# Patient Record
Sex: MALE | Race: WHITE | URBAN - METROPOLITAN AREA
[De-identification: names, ages, dates, MRNs, and addresses within clinical notes are randomized per-mention and may not be internally consistent; named-entity substitution may affect disease eponyms.]

---

## 2017-03-06 ENCOUNTER — OFFICE VISIT (OUTPATIENT)
Dept: INTERNAL MEDICINE CLINIC | Age: 52
End: 2017-03-06

## 2017-03-06 VITALS
BODY MASS INDEX: 33.32 KG/M2 | SYSTOLIC BLOOD PRESSURE: 136 MMHG | WEIGHT: 246 LBS | HEART RATE: 76 BPM | DIASTOLIC BLOOD PRESSURE: 82 MMHG | HEIGHT: 72 IN

## 2017-03-06 DIAGNOSIS — I10 ESSENTIAL HYPERTENSION, BENIGN: Primary | ICD-10-CM

## 2017-03-06 DIAGNOSIS — I10 ESSENTIAL HYPERTENSION, BENIGN: ICD-10-CM

## 2017-03-06 LAB
ANION GAP SERPL CALCULATED.3IONS-SCNC: 14 MMOL/L (ref 3–16)
BUN BLDV-MCNC: 12 MG/DL (ref 7–20)
CALCIUM SERPL-MCNC: 9.3 MG/DL (ref 8.3–10.6)
CHLORIDE BLD-SCNC: 103 MMOL/L (ref 99–110)
CO2: 25 MMOL/L (ref 21–32)
CREAT SERPL-MCNC: 0.9 MG/DL (ref 0.9–1.3)
GFR AFRICAN AMERICAN: >60
GFR NON-AFRICAN AMERICAN: >60
GLUCOSE BLD-MCNC: 105 MG/DL (ref 70–99)
POTASSIUM SERPL-SCNC: 4.6 MMOL/L (ref 3.5–5.1)
SODIUM BLD-SCNC: 142 MMOL/L (ref 136–145)

## 2017-03-06 PROCEDURE — G8484 FLU IMMUNIZE NO ADMIN: HCPCS | Performed by: INTERNAL MEDICINE

## 2017-03-06 PROCEDURE — 99213 OFFICE O/P EST LOW 20 MIN: CPT | Performed by: INTERNAL MEDICINE

## 2017-03-06 PROCEDURE — 1036F TOBACCO NON-USER: CPT | Performed by: INTERNAL MEDICINE

## 2017-03-06 PROCEDURE — G8427 DOCREV CUR MEDS BY ELIG CLIN: HCPCS | Performed by: INTERNAL MEDICINE

## 2017-03-06 PROCEDURE — G8417 CALC BMI ABV UP PARAM F/U: HCPCS | Performed by: INTERNAL MEDICINE

## 2017-03-06 RX ORDER — LISINOPRIL 20 MG/1
TABLET ORAL
Qty: 90 TABLET | Refills: 1 | Status: SHIPPED | OUTPATIENT
Start: 2017-03-06 | End: 2017-09-06 | Stop reason: SDUPTHER

## 2017-03-06 ASSESSMENT — ENCOUNTER SYMPTOMS
SHORTNESS OF BREATH: 0
ABDOMINAL PAIN: 0
NAUSEA: 0
CHEST TIGHTNESS: 0
DIARRHEA: 0
BLOOD IN STOOL: 0
WHEEZING: 0
COUGH: 0
VOMITING: 0

## 2017-05-17 ENCOUNTER — TELEPHONE (OUTPATIENT)
Dept: INTERNAL MEDICINE CLINIC | Age: 52
End: 2017-05-17

## 2017-08-25 ENCOUNTER — OFFICE VISIT (OUTPATIENT)
Dept: INTERNAL MEDICINE CLINIC | Age: 52
End: 2017-08-25

## 2017-08-25 VITALS
BODY MASS INDEX: 33.86 KG/M2 | SYSTOLIC BLOOD PRESSURE: 132 MMHG | WEIGHT: 250 LBS | HEART RATE: 72 BPM | HEIGHT: 72 IN | DIASTOLIC BLOOD PRESSURE: 80 MMHG

## 2017-08-25 DIAGNOSIS — I10 ESSENTIAL HYPERTENSION, BENIGN: ICD-10-CM

## 2017-08-25 DIAGNOSIS — I10 ESSENTIAL HYPERTENSION, BENIGN: Primary | ICD-10-CM

## 2017-08-25 LAB
A/G RATIO: 1.5 (ref 1.1–2.2)
ALBUMIN SERPL-MCNC: 4.7 G/DL (ref 3.4–5)
ALP BLD-CCNC: 56 U/L (ref 40–129)
ALT SERPL-CCNC: 29 U/L (ref 10–40)
ANION GAP SERPL CALCULATED.3IONS-SCNC: 16 MMOL/L (ref 3–16)
AST SERPL-CCNC: 23 U/L (ref 15–37)
BASOPHILS ABSOLUTE: 0 K/UL (ref 0–0.2)
BASOPHILS RELATIVE PERCENT: 0.7 %
BILIRUB SERPL-MCNC: 0.7 MG/DL (ref 0–1)
BUN BLDV-MCNC: 13 MG/DL (ref 7–20)
CALCIUM SERPL-MCNC: 10.1 MG/DL (ref 8.3–10.6)
CHLORIDE BLD-SCNC: 98 MMOL/L (ref 99–110)
CHOLESTEROL, TOTAL: 214 MG/DL (ref 0–199)
CO2: 25 MMOL/L (ref 21–32)
CREAT SERPL-MCNC: 1 MG/DL (ref 0.9–1.3)
EOSINOPHILS ABSOLUTE: 0.1 K/UL (ref 0–0.6)
EOSINOPHILS RELATIVE PERCENT: 1.5 %
GFR AFRICAN AMERICAN: >60
GFR NON-AFRICAN AMERICAN: >60
GLOBULIN: 3.2 G/DL
GLUCOSE BLD-MCNC: 86 MG/DL (ref 70–99)
HCT VFR BLD CALC: 45.8 % (ref 40.5–52.5)
HDLC SERPL-MCNC: 51 MG/DL (ref 40–60)
HEMOGLOBIN: 15.6 G/DL (ref 13.5–17.5)
LDL CHOLESTEROL CALCULATED: 125 MG/DL
LYMPHOCYTES ABSOLUTE: 2.2 K/UL (ref 1–5.1)
LYMPHOCYTES RELATIVE PERCENT: 32.3 %
MCH RBC QN AUTO: 29.9 PG (ref 26–34)
MCHC RBC AUTO-ENTMCNC: 34 G/DL (ref 31–36)
MCV RBC AUTO: 88.1 FL (ref 80–100)
MONOCYTES ABSOLUTE: 0.5 K/UL (ref 0–1.3)
MONOCYTES RELATIVE PERCENT: 7.4 %
NEUTROPHILS ABSOLUTE: 4 K/UL (ref 1.7–7.7)
NEUTROPHILS RELATIVE PERCENT: 58.1 %
PDW BLD-RTO: 13.2 % (ref 12.4–15.4)
PLATELET # BLD: 261 K/UL (ref 135–450)
PMV BLD AUTO: 9.1 FL (ref 5–10.5)
POTASSIUM SERPL-SCNC: 4.2 MMOL/L (ref 3.5–5.1)
RBC # BLD: 5.2 M/UL (ref 4.2–5.9)
SODIUM BLD-SCNC: 139 MMOL/L (ref 136–145)
TOTAL PROTEIN: 7.9 G/DL (ref 6.4–8.2)
TRIGL SERPL-MCNC: 188 MG/DL (ref 0–150)
VLDLC SERPL CALC-MCNC: 38 MG/DL
WBC # BLD: 6.8 K/UL (ref 4–11)

## 2017-08-25 PROCEDURE — 81002 URINALYSIS NONAUTO W/O SCOPE: CPT | Performed by: INTERNAL MEDICINE

## 2017-08-25 PROCEDURE — 99213 OFFICE O/P EST LOW 20 MIN: CPT | Performed by: INTERNAL MEDICINE

## 2017-08-25 PROCEDURE — 1036F TOBACCO NON-USER: CPT | Performed by: INTERNAL MEDICINE

## 2017-08-25 PROCEDURE — G8427 DOCREV CUR MEDS BY ELIG CLIN: HCPCS | Performed by: INTERNAL MEDICINE

## 2017-08-25 PROCEDURE — G8417 CALC BMI ABV UP PARAM F/U: HCPCS | Performed by: INTERNAL MEDICINE

## 2017-08-25 ASSESSMENT — ENCOUNTER SYMPTOMS
NAUSEA: 0
WHEEZING: 0
BLOOD IN STOOL: 0
COUGH: 0
ABDOMINAL PAIN: 0
VOMITING: 0
DIARRHEA: 0
CHEST TIGHTNESS: 0
SHORTNESS OF BREATH: 0

## 2017-09-06 RX ORDER — LISINOPRIL 20 MG/1
TABLET ORAL
Qty: 90 TABLET | Refills: 1 | Status: SHIPPED | OUTPATIENT
Start: 2017-09-06 | End: 2018-03-14 | Stop reason: SDUPTHER

## 2018-03-14 ENCOUNTER — OFFICE VISIT (OUTPATIENT)
Dept: INTERNAL MEDICINE CLINIC | Age: 53
End: 2018-03-14

## 2018-03-14 VITALS
HEIGHT: 72 IN | BODY MASS INDEX: 33.86 KG/M2 | HEART RATE: 76 BPM | WEIGHT: 250 LBS | DIASTOLIC BLOOD PRESSURE: 82 MMHG | SYSTOLIC BLOOD PRESSURE: 110 MMHG

## 2018-03-14 DIAGNOSIS — I10 ESSENTIAL HYPERTENSION, BENIGN: ICD-10-CM

## 2018-03-14 DIAGNOSIS — Z00.00 ROUTINE GENERAL MEDICAL EXAMINATION AT A HEALTH CARE FACILITY: ICD-10-CM

## 2018-03-14 DIAGNOSIS — I10 ESSENTIAL HYPERTENSION, BENIGN: Primary | ICD-10-CM

## 2018-03-14 DIAGNOSIS — M72.2 PLANTAR FASCIITIS: ICD-10-CM

## 2018-03-14 DIAGNOSIS — Z12.11 SCREENING FOR COLON CANCER: ICD-10-CM

## 2018-03-14 LAB
BASOPHILS ABSOLUTE: 0.2 K/UL (ref 0–0.2)
BASOPHILS RELATIVE PERCENT: 1.8 %
EOSINOPHILS ABSOLUTE: 0.1 K/UL (ref 0–0.6)
EOSINOPHILS RELATIVE PERCENT: 1.2 %
HCT VFR BLD CALC: 45.8 % (ref 40.5–52.5)
HEMOGLOBIN: 15.6 G/DL (ref 13.5–17.5)
LYMPHOCYTES ABSOLUTE: 2.6 K/UL (ref 1–5.1)
LYMPHOCYTES RELATIVE PERCENT: 30.4 %
MCH RBC QN AUTO: 29.6 PG (ref 26–34)
MCHC RBC AUTO-ENTMCNC: 34.1 G/DL (ref 31–36)
MCV RBC AUTO: 86.8 FL (ref 80–100)
MONOCYTES ABSOLUTE: 0.6 K/UL (ref 0–1.3)
MONOCYTES RELATIVE PERCENT: 7.1 %
NEUTROPHILS ABSOLUTE: 5.1 K/UL (ref 1.7–7.7)
NEUTROPHILS RELATIVE PERCENT: 59.5 %
PDW BLD-RTO: 13 % (ref 12.4–15.4)
PLATELET # BLD: 273 K/UL (ref 135–450)
PMV BLD AUTO: 9.1 FL (ref 5–10.5)
RBC # BLD: 5.28 M/UL (ref 4.2–5.9)
WBC # BLD: 8.6 K/UL (ref 4–11)

## 2018-03-14 PROCEDURE — 99213 OFFICE O/P EST LOW 20 MIN: CPT | Performed by: INTERNAL MEDICINE

## 2018-03-14 PROCEDURE — G8417 CALC BMI ABV UP PARAM F/U: HCPCS | Performed by: INTERNAL MEDICINE

## 2018-03-14 PROCEDURE — 3017F COLORECTAL CA SCREEN DOC REV: CPT | Performed by: INTERNAL MEDICINE

## 2018-03-14 PROCEDURE — G8484 FLU IMMUNIZE NO ADMIN: HCPCS | Performed by: INTERNAL MEDICINE

## 2018-03-14 PROCEDURE — G8427 DOCREV CUR MEDS BY ELIG CLIN: HCPCS | Performed by: INTERNAL MEDICINE

## 2018-03-14 PROCEDURE — 1036F TOBACCO NON-USER: CPT | Performed by: INTERNAL MEDICINE

## 2018-03-14 RX ORDER — LISINOPRIL 10 MG/1
TABLET ORAL
Qty: 30 TABLET | Refills: 2 | Status: SHIPPED | OUTPATIENT
Start: 2018-03-14 | End: 2018-06-15 | Stop reason: SDUPTHER

## 2018-03-14 ASSESSMENT — ENCOUNTER SYMPTOMS
BLOOD IN STOOL: 0
ABDOMINAL PAIN: 0
SHORTNESS OF BREATH: 0
WHEEZING: 0
CHEST TIGHTNESS: 0
NAUSEA: 0
COUGH: 0
VOMITING: 0
DIARRHEA: 0

## 2018-03-14 NOTE — PROGRESS NOTES
Subjective:      Patient ID: Nessa Morton is a 50 y.o. male. HPI  Is here for follow up of HTN. Patient's BP is controlled on current medications. He is doing well. c/o left foot pain. Review of Systems   Constitutional: Negative. Negative for fatigue and fever. Respiratory: Negative for cough, chest tightness, shortness of breath and wheezing. Cardiovascular: Negative for chest pain and palpitations. Gastrointestinal: Negative for abdominal pain, blood in stool, diarrhea, nausea and vomiting. Objective:   Physical Exam   Constitutional: He is oriented to person, place, and time. He appears well-developed and well-nourished. HENT:   Head: Normocephalic and atraumatic. Eyes: Pupils are equal, round, and reactive to light. Neck: Normal range of motion. Neck supple. No thyromegaly present. Cardiovascular: Normal rate, regular rhythm and normal heart sounds. Exam reveals no gallop and no friction rub. No murmur heard. No carotid bruit   Pulmonary/Chest: Effort normal and breath sounds normal. No respiratory distress. He has no wheezes. He has no rales. He exhibits no tenderness. Abdominal: Soft. Bowel sounds are normal. He exhibits no distension and no mass. There is no tenderness. There is no rebound and no guarding. Musculoskeletal: He exhibits no edema. Neurological: He is alert and oriented to person, place, and time. Assessment:      1. Essential hypertension, benign  CBC Auto Differential    Comprehensive Metabolic Panel   2. Plantar fasciitis     3. Routine general medical examination at a health care facility  Hemoglobin A1C    Comprehensive Metabolic Panel    PSA, Prostatic Specific Antigen           Plan:      Blood pressure is low. Decrease lisinopril to 10 mg daily    Schedule colonoscopy. Exercises. Soft insoles.     Routine labs.

## 2018-03-15 LAB
A/G RATIO: 1.6 (ref 1.1–2.2)
ALBUMIN SERPL-MCNC: 4.5 G/DL (ref 3.4–5)
ALP BLD-CCNC: 58 U/L (ref 40–129)
ALT SERPL-CCNC: 30 U/L (ref 10–40)
ANION GAP SERPL CALCULATED.3IONS-SCNC: 18 MMOL/L (ref 3–16)
AST SERPL-CCNC: 26 U/L (ref 15–37)
BILIRUB SERPL-MCNC: 0.8 MG/DL (ref 0–1)
BUN BLDV-MCNC: 13 MG/DL (ref 7–20)
CALCIUM SERPL-MCNC: 9.2 MG/DL (ref 8.3–10.6)
CHLORIDE BLD-SCNC: 99 MMOL/L (ref 99–110)
CO2: 23 MMOL/L (ref 21–32)
CREAT SERPL-MCNC: 0.9 MG/DL (ref 0.9–1.3)
ESTIMATED AVERAGE GLUCOSE: 116.9 MG/DL
GFR AFRICAN AMERICAN: >60
GFR NON-AFRICAN AMERICAN: >60
GLOBULIN: 2.8 G/DL
GLUCOSE BLD-MCNC: 81 MG/DL (ref 70–99)
HBA1C MFR BLD: 5.7 %
POTASSIUM SERPL-SCNC: 4.2 MMOL/L (ref 3.5–5.1)
PROSTATE SPECIFIC ANTIGEN: 0.89 NG/ML (ref 0–4)
SODIUM BLD-SCNC: 140 MMOL/L (ref 136–145)
TOTAL PROTEIN: 7.3 G/DL (ref 6.4–8.2)

## 2018-06-15 ENCOUNTER — TELEPHONE (OUTPATIENT)
Dept: INTERNAL MEDICINE CLINIC | Age: 53
End: 2018-06-15

## 2018-06-15 RX ORDER — LISINOPRIL 10 MG/1
TABLET ORAL
Qty: 13 TABLET | Refills: 0 | Status: SHIPPED | OUTPATIENT
Start: 2018-06-15 | End: 2018-06-27 | Stop reason: SDUPTHER

## 2018-06-27 ENCOUNTER — OFFICE VISIT (OUTPATIENT)
Dept: INTERNAL MEDICINE CLINIC | Age: 53
End: 2018-06-27

## 2018-06-27 VITALS
DIASTOLIC BLOOD PRESSURE: 80 MMHG | WEIGHT: 238 LBS | BODY MASS INDEX: 32.23 KG/M2 | SYSTOLIC BLOOD PRESSURE: 122 MMHG | HEIGHT: 72 IN | HEART RATE: 80 BPM

## 2018-06-27 DIAGNOSIS — I10 ESSENTIAL HYPERTENSION, BENIGN: Primary | ICD-10-CM

## 2018-06-27 PROCEDURE — G8427 DOCREV CUR MEDS BY ELIG CLIN: HCPCS | Performed by: INTERNAL MEDICINE

## 2018-06-27 PROCEDURE — G8417 CALC BMI ABV UP PARAM F/U: HCPCS | Performed by: INTERNAL MEDICINE

## 2018-06-27 PROCEDURE — 99213 OFFICE O/P EST LOW 20 MIN: CPT | Performed by: INTERNAL MEDICINE

## 2018-06-27 PROCEDURE — 3017F COLORECTAL CA SCREEN DOC REV: CPT | Performed by: INTERNAL MEDICINE

## 2018-06-27 PROCEDURE — 1036F TOBACCO NON-USER: CPT | Performed by: INTERNAL MEDICINE

## 2018-06-27 RX ORDER — LISINOPRIL 10 MG/1
TABLET ORAL
Qty: 90 TABLET | Refills: 1 | Status: SHIPPED | OUTPATIENT
Start: 2018-06-27 | End: 2018-12-29 | Stop reason: SDUPTHER

## 2018-06-27 ASSESSMENT — ENCOUNTER SYMPTOMS
BLOOD IN STOOL: 0
COUGH: 0
NAUSEA: 0
ABDOMINAL PAIN: 0
VOMITING: 0
CHEST TIGHTNESS: 0
DIARRHEA: 0
SHORTNESS OF BREATH: 0
WHEEZING: 0

## 2018-12-31 RX ORDER — LISINOPRIL 10 MG/1
TABLET ORAL
Qty: 90 TABLET | Refills: 1 | Status: SHIPPED | OUTPATIENT
Start: 2018-12-31 | End: 2019-09-11 | Stop reason: SDUPTHER

## 2019-03-04 ENCOUNTER — OFFICE VISIT (OUTPATIENT)
Dept: INTERNAL MEDICINE CLINIC | Age: 54
End: 2019-03-04

## 2019-03-04 VITALS
SYSTOLIC BLOOD PRESSURE: 130 MMHG | HEIGHT: 72 IN | BODY MASS INDEX: 33.05 KG/M2 | DIASTOLIC BLOOD PRESSURE: 80 MMHG | WEIGHT: 244 LBS | HEART RATE: 76 BPM

## 2019-03-04 DIAGNOSIS — I10 ESSENTIAL HYPERTENSION: ICD-10-CM

## 2019-03-04 DIAGNOSIS — I10 ESSENTIAL HYPERTENSION: Primary | ICD-10-CM

## 2019-03-04 DIAGNOSIS — Z00.00 ROUTINE GENERAL MEDICAL EXAMINATION AT A HEALTH CARE FACILITY: ICD-10-CM

## 2019-03-04 LAB
A/G RATIO: 1.8 (ref 1.1–2.2)
ALBUMIN SERPL-MCNC: 4.9 G/DL (ref 3.4–5)
ALP BLD-CCNC: 64 U/L (ref 40–129)
ALT SERPL-CCNC: 27 U/L (ref 10–40)
ANION GAP SERPL CALCULATED.3IONS-SCNC: 16 MMOL/L (ref 3–16)
AST SERPL-CCNC: 22 U/L (ref 15–37)
BASOPHILS ABSOLUTE: 0.1 K/UL (ref 0–0.2)
BASOPHILS RELATIVE PERCENT: 0.7 %
BILIRUB SERPL-MCNC: 0.7 MG/DL (ref 0–1)
BUN BLDV-MCNC: 12 MG/DL (ref 7–20)
CALCIUM SERPL-MCNC: 9.8 MG/DL (ref 8.3–10.6)
CHLORIDE BLD-SCNC: 99 MMOL/L (ref 99–110)
CHOLESTEROL, TOTAL: 175 MG/DL (ref 0–199)
CO2: 24 MMOL/L (ref 21–32)
CREAT SERPL-MCNC: 0.9 MG/DL (ref 0.9–1.3)
EOSINOPHILS ABSOLUTE: 0.1 K/UL (ref 0–0.6)
EOSINOPHILS RELATIVE PERCENT: 1 %
GFR AFRICAN AMERICAN: >60
GFR NON-AFRICAN AMERICAN: >60
GLOBULIN: 2.8 G/DL
GLUCOSE BLD-MCNC: 86 MG/DL (ref 70–99)
HCT VFR BLD CALC: 46.7 % (ref 40.5–52.5)
HDLC SERPL-MCNC: 46 MG/DL (ref 40–60)
HEMOGLOBIN: 15.7 G/DL (ref 13.5–17.5)
LDL CHOLESTEROL CALCULATED: 87 MG/DL
LYMPHOCYTES ABSOLUTE: 2.7 K/UL (ref 1–5.1)
LYMPHOCYTES RELATIVE PERCENT: 34.1 %
MCH RBC QN AUTO: 29.7 PG (ref 26–34)
MCHC RBC AUTO-ENTMCNC: 33.7 G/DL (ref 31–36)
MCV RBC AUTO: 88.4 FL (ref 80–100)
MONOCYTES ABSOLUTE: 0.6 K/UL (ref 0–1.3)
MONOCYTES RELATIVE PERCENT: 7.6 %
NEUTROPHILS ABSOLUTE: 4.5 K/UL (ref 1.7–7.7)
NEUTROPHILS RELATIVE PERCENT: 56.6 %
PDW BLD-RTO: 13.1 % (ref 12.4–15.4)
PLATELET # BLD: 298 K/UL (ref 135–450)
PMV BLD AUTO: 8.8 FL (ref 5–10.5)
POTASSIUM SERPL-SCNC: 4.4 MMOL/L (ref 3.5–5.1)
PROSTATE SPECIFIC ANTIGEN: 1.16 NG/ML (ref 0–4)
RBC # BLD: 5.29 M/UL (ref 4.2–5.9)
SODIUM BLD-SCNC: 139 MMOL/L (ref 136–145)
TOTAL PROTEIN: 7.7 G/DL (ref 6.4–8.2)
TRIGL SERPL-MCNC: 209 MG/DL (ref 0–150)
VLDLC SERPL CALC-MCNC: 42 MG/DL
WBC # BLD: 8 K/UL (ref 4–11)

## 2019-03-04 PROCEDURE — G8484 FLU IMMUNIZE NO ADMIN: HCPCS | Performed by: INTERNAL MEDICINE

## 2019-03-04 PROCEDURE — 3017F COLORECTAL CA SCREEN DOC REV: CPT | Performed by: INTERNAL MEDICINE

## 2019-03-04 PROCEDURE — 99213 OFFICE O/P EST LOW 20 MIN: CPT | Performed by: INTERNAL MEDICINE

## 2019-03-04 PROCEDURE — G8427 DOCREV CUR MEDS BY ELIG CLIN: HCPCS | Performed by: INTERNAL MEDICINE

## 2019-03-04 PROCEDURE — 1036F TOBACCO NON-USER: CPT | Performed by: INTERNAL MEDICINE

## 2019-03-04 PROCEDURE — G8417 CALC BMI ABV UP PARAM F/U: HCPCS | Performed by: INTERNAL MEDICINE

## 2019-03-04 ASSESSMENT — ENCOUNTER SYMPTOMS
BLOOD IN STOOL: 0
VOMITING: 0
COUGH: 0
ABDOMINAL PAIN: 0
NAUSEA: 0
SHORTNESS OF BREATH: 0
CHEST TIGHTNESS: 0
WHEEZING: 0
DIARRHEA: 0

## 2019-03-05 LAB
ESTIMATED AVERAGE GLUCOSE: 119.8 MG/DL
HBA1C MFR BLD: 5.8 %

## 2019-03-11 ENCOUNTER — TELEPHONE (OUTPATIENT)
Dept: INTERNAL MEDICINE CLINIC | Age: 54
End: 2019-03-11

## 2019-03-13 ENCOUNTER — NURSE ONLY (OUTPATIENT)
Dept: INTERNAL MEDICINE CLINIC | Age: 54
End: 2019-03-13

## 2019-03-13 DIAGNOSIS — Z12.11 COLON CANCER SCREENING: Primary | ICD-10-CM

## 2019-03-13 LAB
CONTROL: NORMAL
HEMOCCULT STL QL: NORMAL

## 2019-03-13 PROCEDURE — 82274 ASSAY TEST FOR BLOOD FECAL: CPT | Performed by: INTERNAL MEDICINE

## 2019-09-11 ENCOUNTER — OFFICE VISIT (OUTPATIENT)
Dept: INTERNAL MEDICINE CLINIC | Age: 54
End: 2019-09-11

## 2019-09-11 VITALS
HEART RATE: 76 BPM | HEIGHT: 72 IN | SYSTOLIC BLOOD PRESSURE: 110 MMHG | WEIGHT: 239 LBS | BODY MASS INDEX: 32.37 KG/M2 | DIASTOLIC BLOOD PRESSURE: 80 MMHG

## 2019-09-11 DIAGNOSIS — M72.2 PLANTAR FASCIITIS: ICD-10-CM

## 2019-09-11 DIAGNOSIS — I10 ESSENTIAL HYPERTENSION, BENIGN: Primary | ICD-10-CM

## 2019-09-11 PROCEDURE — 1036F TOBACCO NON-USER: CPT | Performed by: INTERNAL MEDICINE

## 2019-09-11 PROCEDURE — G8417 CALC BMI ABV UP PARAM F/U: HCPCS | Performed by: INTERNAL MEDICINE

## 2019-09-11 PROCEDURE — G8427 DOCREV CUR MEDS BY ELIG CLIN: HCPCS | Performed by: INTERNAL MEDICINE

## 2019-09-11 PROCEDURE — 99213 OFFICE O/P EST LOW 20 MIN: CPT | Performed by: INTERNAL MEDICINE

## 2019-09-11 PROCEDURE — 3017F COLORECTAL CA SCREEN DOC REV: CPT | Performed by: INTERNAL MEDICINE

## 2019-09-11 RX ORDER — LISINOPRIL 10 MG/1
TABLET ORAL
Qty: 90 TABLET | Refills: 1 | Status: SHIPPED | OUTPATIENT
Start: 2019-09-11 | End: 2020-03-11 | Stop reason: SDUPTHER

## 2019-09-11 ASSESSMENT — ENCOUNTER SYMPTOMS
ABDOMINAL PAIN: 0
SHORTNESS OF BREATH: 0
WHEEZING: 0
NAUSEA: 0
VOMITING: 0
DIARRHEA: 0
BLOOD IN STOOL: 0
CHEST TIGHTNESS: 0
COUGH: 0

## 2019-09-11 NOTE — PATIENT INSTRUCTIONS
Patient Education        Plantar Fasciitis: Exercises  Introduction  Here are some examples of exercises for you to try. The exercises may be suggested for a condition or for rehabilitation. Start each exercise slowly. Ease off the exercises if you start to have pain. You will be told when to start these exercises and which ones will work best for you. How to do the exercises  Towel stretch    1. Sit with your legs extended and knees straight. 2. Place a towel around your foot just under the toes. 3. Hold each end of the towel in each hand, with your hands above your knees. 4. Pull back with the towel so that your foot stretches toward you. 5. Hold the position for at least 15 to 30 seconds. 6. Repeat 2 to 4 times a session, up to 5 sessions a day. Calf stretch    1. Stand facing a wall with your hands on the wall at about eye level. Put the leg you want to stretch about a step behind your other leg. 2. Keeping your back heel on the floor, bend your front knee until you feel a stretch in the back leg. 3. Hold the stretch for 15 to 30 seconds. Repeat 2 to 4 times. Plantar fascia and calf stretch    1. Stand on a step as shown above. Be sure to hold on to the banister. 2. Slowly let your heels down over the edge of the step as you relax your calf muscles. You should feel a gentle stretch across the bottom of your foot and up the back of your leg to your knee. 3. Hold the stretch about 15 to 30 seconds, and then tighten your calf muscle a little to bring your heel back up to the level of the step. Repeat 2 to 4 times. Towel curls    1. While sitting, place your foot on a towel on the floor and scrunch the towel toward you with your toes. 2. Then, also using your toes, push the towel away from you. [place] pickups    1. Put marbles on the floor next to a cup.  2. Using your toes, try to lift the marbles up from the floor and put them in the cup.     Follow-up care is a key part of your treatment

## 2020-03-11 ENCOUNTER — OFFICE VISIT (OUTPATIENT)
Dept: INTERNAL MEDICINE CLINIC | Age: 55
End: 2020-03-11

## 2020-03-11 VITALS
SYSTOLIC BLOOD PRESSURE: 138 MMHG | DIASTOLIC BLOOD PRESSURE: 90 MMHG | HEART RATE: 76 BPM | WEIGHT: 250 LBS | HEIGHT: 72 IN | BODY MASS INDEX: 33.86 KG/M2

## 2020-03-11 DIAGNOSIS — I10 ESSENTIAL HYPERTENSION, BENIGN: ICD-10-CM

## 2020-03-11 DIAGNOSIS — Z00.00 ROUTINE GENERAL MEDICAL EXAMINATION AT A HEALTH CARE FACILITY: ICD-10-CM

## 2020-03-11 PROCEDURE — 1036F TOBACCO NON-USER: CPT | Performed by: INTERNAL MEDICINE

## 2020-03-11 PROCEDURE — G8484 FLU IMMUNIZE NO ADMIN: HCPCS | Performed by: INTERNAL MEDICINE

## 2020-03-11 PROCEDURE — 3017F COLORECTAL CA SCREEN DOC REV: CPT | Performed by: INTERNAL MEDICINE

## 2020-03-11 PROCEDURE — G8427 DOCREV CUR MEDS BY ELIG CLIN: HCPCS | Performed by: INTERNAL MEDICINE

## 2020-03-11 PROCEDURE — G8417 CALC BMI ABV UP PARAM F/U: HCPCS | Performed by: INTERNAL MEDICINE

## 2020-03-11 PROCEDURE — 99213 OFFICE O/P EST LOW 20 MIN: CPT | Performed by: INTERNAL MEDICINE

## 2020-03-11 RX ORDER — LISINOPRIL 20 MG/1
TABLET ORAL
Qty: 30 TABLET | Refills: 2 | Status: SHIPPED | OUTPATIENT
Start: 2020-03-11 | End: 2020-06-09

## 2020-03-11 ASSESSMENT — ENCOUNTER SYMPTOMS
ABDOMINAL PAIN: 0
WHEEZING: 0
CHEST TIGHTNESS: 0
NAUSEA: 0
BLOOD IN STOOL: 0
VOMITING: 0
DIARRHEA: 0
SHORTNESS OF BREATH: 0
COUGH: 0

## 2020-03-12 LAB
ANION GAP SERPL CALCULATED.3IONS-SCNC: 13 MMOL/L (ref 3–16)
BUN BLDV-MCNC: 11 MG/DL (ref 7–20)
CALCIUM SERPL-MCNC: 9.6 MG/DL (ref 8.3–10.6)
CHLORIDE BLD-SCNC: 101 MMOL/L (ref 99–110)
CO2: 23 MMOL/L (ref 21–32)
CREAT SERPL-MCNC: 1 MG/DL (ref 0.9–1.3)
ESTIMATED AVERAGE GLUCOSE: 108.3 MG/DL
GFR AFRICAN AMERICAN: >60
GFR NON-AFRICAN AMERICAN: >60
GLUCOSE BLD-MCNC: 93 MG/DL (ref 70–99)
HBA1C MFR BLD: 5.4 %
POTASSIUM SERPL-SCNC: 4.6 MMOL/L (ref 3.5–5.1)
PROSTATE SPECIFIC ANTIGEN: 0.89 NG/ML (ref 0–4)
SODIUM BLD-SCNC: 137 MMOL/L (ref 136–145)

## 2020-06-09 RX ORDER — LISINOPRIL 20 MG/1
TABLET ORAL
Qty: 90 TABLET | Refills: 0 | Status: SHIPPED | OUTPATIENT
Start: 2020-06-09 | End: 2020-09-14 | Stop reason: SDUPTHER

## 2020-09-14 ENCOUNTER — OFFICE VISIT (OUTPATIENT)
Dept: INTERNAL MEDICINE CLINIC | Age: 55
End: 2020-09-14

## 2020-09-14 VITALS
DIASTOLIC BLOOD PRESSURE: 80 MMHG | HEART RATE: 76 BPM | HEIGHT: 72 IN | BODY MASS INDEX: 33.59 KG/M2 | SYSTOLIC BLOOD PRESSURE: 116 MMHG | WEIGHT: 248 LBS

## 2020-09-14 DIAGNOSIS — E78.5 HYPERLIPIDEMIA, UNSPECIFIED HYPERLIPIDEMIA TYPE: ICD-10-CM

## 2020-09-14 PROCEDURE — 1036F TOBACCO NON-USER: CPT | Performed by: INTERNAL MEDICINE

## 2020-09-14 PROCEDURE — G0008 ADMIN INFLUENZA VIRUS VAC: HCPCS | Performed by: INTERNAL MEDICINE

## 2020-09-14 PROCEDURE — G8417 CALC BMI ABV UP PARAM F/U: HCPCS | Performed by: INTERNAL MEDICINE

## 2020-09-14 PROCEDURE — 90682 RIV4 VACC RECOMBINANT DNA IM: CPT | Performed by: INTERNAL MEDICINE

## 2020-09-14 PROCEDURE — 3017F COLORECTAL CA SCREEN DOC REV: CPT | Performed by: INTERNAL MEDICINE

## 2020-09-14 PROCEDURE — G8428 CUR MEDS NOT DOCUMENT: HCPCS | Performed by: INTERNAL MEDICINE

## 2020-09-14 PROCEDURE — 99213 OFFICE O/P EST LOW 20 MIN: CPT | Performed by: INTERNAL MEDICINE

## 2020-09-14 RX ORDER — LISINOPRIL 20 MG/1
TABLET ORAL
Qty: 90 TABLET | Refills: 1 | Status: SHIPPED | OUTPATIENT
Start: 2020-09-14 | End: 2021-03-10

## 2020-09-14 ASSESSMENT — ENCOUNTER SYMPTOMS
WHEEZING: 0
CHEST TIGHTNESS: 0
SHORTNESS OF BREATH: 0
ABDOMINAL PAIN: 0
NAUSEA: 0
VOMITING: 0
COUGH: 0
BLOOD IN STOOL: 0
DIARRHEA: 0

## 2020-09-14 NOTE — PROGRESS NOTES
Subjective:      Patient ID: Alfreda Vick is a 48 y.o. male. HPI  Is here for follow up of HTN. Patient's BP is controlled on current medications. He is doing well. Review of Systems   Constitutional: Negative. Negative for fatigue and fever. Respiratory: Negative for cough, chest tightness, shortness of breath and wheezing. Cardiovascular: Negative for chest pain and palpitations. Gastrointestinal: Negative for abdominal pain, blood in stool, diarrhea, nausea and vomiting. Objective:   Physical Exam  Constitutional:       Appearance: He is well-developed. HENT:      Head: Normocephalic and atraumatic. Eyes:      Pupils: Pupils are equal, round, and reactive to light. Neck:      Musculoskeletal: Normal range of motion and neck supple. Thyroid: No thyromegaly. Cardiovascular:      Rate and Rhythm: Normal rate and regular rhythm. Heart sounds: Normal heart sounds. No murmur. No friction rub. No gallop. Comments: No carotid bruit  Pulmonary:      Effort: Pulmonary effort is normal. No respiratory distress. Breath sounds: Normal breath sounds. No wheezing or rales. Chest:      Chest wall: No tenderness. Abdominal:      General: Bowel sounds are normal. There is no distension. Palpations: Abdomen is soft. There is no mass. Tenderness: There is no abdominal tenderness. There is no guarding or rebound. Neurological:      Mental Status: He is alert and oriented to person, place, and time. Assessment:       Diagnosis Orders   1. Essential hypertension, benign     2. Need for influenza vaccination     3. Routine general medical examination at a health care facility     4.  Hyperlipidemia, unspecified hyperlipidemia type  Lipid Panel           Plan:        HTN  Blood pressure is good  continue   lisinopril  20 mg daily   Advised weight loss     FIT test         Amy Cheek MD

## 2020-09-15 LAB
CHOLESTEROL, TOTAL: 202 MG/DL (ref 0–199)
HDLC SERPL-MCNC: 51 MG/DL (ref 40–60)
LDL CHOLESTEROL CALCULATED: 121 MG/DL
TRIGL SERPL-MCNC: 152 MG/DL (ref 0–150)
VLDLC SERPL CALC-MCNC: 30 MG/DL

## 2020-09-21 ENCOUNTER — NURSE ONLY (OUTPATIENT)
Dept: INTERNAL MEDICINE CLINIC | Age: 55
End: 2020-09-21

## 2020-09-21 LAB
CONTROL: NORMAL
HEMOCCULT STL QL: NORMAL

## 2020-09-21 PROCEDURE — 82274 ASSAY TEST FOR BLOOD FECAL: CPT | Performed by: INTERNAL MEDICINE

## 2020-10-16 ENCOUNTER — TELEPHONE (OUTPATIENT)
Dept: INTERNAL MEDICINE CLINIC | Age: 55
End: 2020-10-16

## 2020-10-16 NOTE — TELEPHONE ENCOUNTER
----- Message from Max De Oliveira MD sent at 10/16/2020  3:12 PM EDT -----  Contact: [phone]  Can return after 10 days after his symptoms resolve   If work requires retesting ok to order after 10 days   ----- Message -----  From: Vaca Marvin  Sent: 10/16/2020  12:53 PM EDT  To: Max De Oliveira MD    Pt informed. Pt wants to know if he needs to retest in 14 days and when he can return to work? He is on vacation for three weeks. ----- Message -----  From: Max De Oliveira MD  Sent: 10/16/2020  12:35 PM EDT  To: Anna Tovar   But if he develops shortness of breath, chest pain he should come to the emergency room  ----- Message -----  From: Anna Glynn  Sent: 10/16/2020  12:00 PM EDT  To: Max De Oliveira MD    Pt. Has tested positive for covid on 10-12-20 and said he is symptom free does he need to take any medications what else does he need to do? Jeoffrey Mortimer he was tested bc his son was tested who lives with him both of the test was positive and wondering why since they are symptom free.  Does he need to do anything else?-sd

## 2020-10-16 NOTE — TELEPHONE ENCOUNTER
----- Message from Karine Cloud MD sent at 10/16/2020 12:35 PM EDT -----  Contact: [phone]  No   But if he develops shortness of breath, chest pain he should come to the emergency room  ----- Message -----  From: Pascual Javier: 10/16/2020  12:00 PM EDT  To: Karine Cloud MD    Pt. Has tested positive for covid on 10-12-20 and said he is symptom free does he need to take any medications what else does he need to do? Monica Bettencourt he was tested bc his son was tested who lives with him both of the test was positive and wondering why since they are symptom free.  Does he need to do anything else?-sd

## 2020-10-16 NOTE — TELEPHONE ENCOUNTER
----- Message from Lashell Jack MD sent at 10/16/2020  3:12 PM EDT -----  Contact: [phone]  Can return after 10 days after his symptoms resolve   If work requires retesting ok to order after 10 days   ----- Message -----  From: Taylor Rain  Sent: 10/16/2020  12:53 PM EDT  To: Lashell Jack MD    Pt informed. Pt wants to know if he needs to retest in 14 days and when he can return to work? He is on vacation for three weeks. ----- Message -----  From: Lashell Jack MD  Sent: 10/16/2020  12:35 PM EDT  To: Kimmy Tovar   But if he develops shortness of breath, chest pain he should come to the emergency room  ----- Message -----  From: Kimmy Voss  Sent: 10/16/2020  12:00 PM EDT  To: Lashell Jack MD    Pt. Has tested positive for covid on 10-12-20 and said he is symptom free does he need to take any medications what else does he need to do? Ami Umang he was tested bc his son was tested who lives with him both of the test was positive and wondering why since they are symptom free.  Does he need to do anything else?-sd

## 2021-02-17 ENCOUNTER — TELEPHONE (OUTPATIENT)
Dept: INTERNAL MEDICINE CLINIC | Age: 56
End: 2021-02-17

## 2021-02-17 NOTE — TELEPHONE ENCOUNTER
----- Message from Ashley Munson MD sent at 2021  1:19 PM EST -----  Contact: Pt w/ - [phone]  Metamucil ok  Biotin ok   Metamucil will not cause weight loss  If he loses weight on keto diet it will help BP  ----- Message -----  From: Stacey Madera: 2021  11:03 AM EST  To: Ashley Munson MD    Pt called asking if he could take the Metamucil 4 in 1 Fiber that is the orange drink. Pt didn't know if it would cause an issue with his BP medications. Pt also wants to know if he can take Biotin vitamin for hair, skin, and nails. He also wants to know if the Metamucil will help with weight loss and would also like to talk to you about the Keto diet if that would help with his BP. Please advise.       Pt w/ - [phone]

## 2021-03-10 RX ORDER — LISINOPRIL 20 MG/1
TABLET ORAL
Qty: 30 TABLET | Refills: 0 | Status: SHIPPED | OUTPATIENT
Start: 2021-03-10 | End: 2021-04-14 | Stop reason: SDUPTHER

## 2021-04-14 ENCOUNTER — OFFICE VISIT (OUTPATIENT)
Dept: INTERNAL MEDICINE CLINIC | Age: 56
End: 2021-04-14

## 2021-04-14 VITALS
SYSTOLIC BLOOD PRESSURE: 132 MMHG | BODY MASS INDEX: 32.78 KG/M2 | WEIGHT: 242 LBS | HEART RATE: 84 BPM | DIASTOLIC BLOOD PRESSURE: 82 MMHG | TEMPERATURE: 97.7 F | HEIGHT: 72 IN

## 2021-04-14 DIAGNOSIS — I10 ESSENTIAL HYPERTENSION, BENIGN: ICD-10-CM

## 2021-04-14 DIAGNOSIS — I10 ESSENTIAL HYPERTENSION, BENIGN: Primary | ICD-10-CM

## 2021-04-14 PROCEDURE — 99213 OFFICE O/P EST LOW 20 MIN: CPT | Performed by: INTERNAL MEDICINE

## 2021-04-14 PROCEDURE — G8427 DOCREV CUR MEDS BY ELIG CLIN: HCPCS | Performed by: INTERNAL MEDICINE

## 2021-04-14 PROCEDURE — 1036F TOBACCO NON-USER: CPT | Performed by: INTERNAL MEDICINE

## 2021-04-14 PROCEDURE — G8417 CALC BMI ABV UP PARAM F/U: HCPCS | Performed by: INTERNAL MEDICINE

## 2021-04-14 PROCEDURE — 3017F COLORECTAL CA SCREEN DOC REV: CPT | Performed by: INTERNAL MEDICINE

## 2021-04-14 RX ORDER — LISINOPRIL 20 MG/1
TABLET ORAL
Qty: 30 TABLET | Refills: 5 | Status: SHIPPED | OUTPATIENT
Start: 2021-04-14 | End: 2021-10-08 | Stop reason: SDUPTHER

## 2021-04-14 ASSESSMENT — ENCOUNTER SYMPTOMS
CHEST TIGHTNESS: 0
NAUSEA: 0
DIARRHEA: 0
SHORTNESS OF BREATH: 0
COUGH: 0
WHEEZING: 0
ABDOMINAL PAIN: 0
VOMITING: 0
BLOOD IN STOOL: 0

## 2021-04-15 LAB
ANION GAP SERPL CALCULATED.3IONS-SCNC: 15 MMOL/L (ref 3–16)
BASOPHILS ABSOLUTE: 0 K/UL (ref 0–0.2)
BASOPHILS RELATIVE PERCENT: 0.3 %
BUN BLDV-MCNC: 15 MG/DL (ref 7–20)
CALCIUM SERPL-MCNC: 9.4 MG/DL (ref 8.3–10.6)
CHLORIDE BLD-SCNC: 99 MMOL/L (ref 99–110)
CO2: 23 MMOL/L (ref 21–32)
CREAT SERPL-MCNC: 1 MG/DL (ref 0.9–1.3)
EOSINOPHILS ABSOLUTE: 0.1 K/UL (ref 0–0.6)
EOSINOPHILS RELATIVE PERCENT: 1.3 %
GFR AFRICAN AMERICAN: >60
GFR NON-AFRICAN AMERICAN: >60
GLUCOSE BLD-MCNC: 91 MG/DL (ref 70–99)
HCT VFR BLD CALC: 46.7 % (ref 40.5–52.5)
HEMOGLOBIN: 15.6 G/DL (ref 13.5–17.5)
LYMPHOCYTES ABSOLUTE: 2.3 K/UL (ref 1–5.1)
LYMPHOCYTES RELATIVE PERCENT: 33.9 %
MCH RBC QN AUTO: 29.4 PG (ref 26–34)
MCHC RBC AUTO-ENTMCNC: 33.3 G/DL (ref 31–36)
MCV RBC AUTO: 88.3 FL (ref 80–100)
MONOCYTES ABSOLUTE: 0.5 K/UL (ref 0–1.3)
MONOCYTES RELATIVE PERCENT: 6.8 %
NEUTROPHILS ABSOLUTE: 3.9 K/UL (ref 1.7–7.7)
NEUTROPHILS RELATIVE PERCENT: 57.7 %
PDW BLD-RTO: 13.4 % (ref 12.4–15.4)
PLATELET # BLD: 272 K/UL (ref 135–450)
PMV BLD AUTO: 8.8 FL (ref 5–10.5)
POTASSIUM SERPL-SCNC: 4.2 MMOL/L (ref 3.5–5.1)
RBC # BLD: 5.29 M/UL (ref 4.2–5.9)
SODIUM BLD-SCNC: 137 MMOL/L (ref 136–145)
WBC # BLD: 6.7 K/UL (ref 4–11)

## 2021-10-08 RX ORDER — LISINOPRIL 20 MG/1
TABLET ORAL
Qty: 30 TABLET | Refills: 0 | Status: SHIPPED | OUTPATIENT
Start: 2021-10-08 | End: 2021-11-03 | Stop reason: SDUPTHER

## 2021-11-03 ENCOUNTER — OFFICE VISIT (OUTPATIENT)
Dept: INTERNAL MEDICINE CLINIC | Age: 56
End: 2021-11-03

## 2021-11-03 VITALS
SYSTOLIC BLOOD PRESSURE: 114 MMHG | HEIGHT: 72 IN | HEART RATE: 76 BPM | DIASTOLIC BLOOD PRESSURE: 78 MMHG | BODY MASS INDEX: 33.46 KG/M2 | WEIGHT: 247 LBS

## 2021-11-03 DIAGNOSIS — Z23 NEED FOR INFLUENZA VACCINATION: ICD-10-CM

## 2021-11-03 DIAGNOSIS — I10 ESSENTIAL HYPERTENSION, BENIGN: Primary | ICD-10-CM

## 2021-11-03 PROCEDURE — 99213 OFFICE O/P EST LOW 20 MIN: CPT | Performed by: INTERNAL MEDICINE

## 2021-11-03 PROCEDURE — 90686 IIV4 VACC NO PRSV 0.5 ML IM: CPT | Performed by: INTERNAL MEDICINE

## 2021-11-03 PROCEDURE — G0008 ADMIN INFLUENZA VIRUS VAC: HCPCS | Performed by: INTERNAL MEDICINE

## 2021-11-03 RX ORDER — LISINOPRIL 20 MG/1
TABLET ORAL
Qty: 30 TABLET | Refills: 5 | Status: SHIPPED | OUTPATIENT
Start: 2021-11-03 | End: 2022-05-16

## 2021-11-03 ASSESSMENT — ENCOUNTER SYMPTOMS
VOMITING: 0
COUGH: 0
DIARRHEA: 0
BLOOD IN STOOL: 0
WHEEZING: 0
CHEST TIGHTNESS: 0
SHORTNESS OF BREATH: 0
ABDOMINAL PAIN: 0
NAUSEA: 0

## 2021-11-03 NOTE — PROGRESS NOTES
Bessie Harden (:  1965) is a 49 y.o. male,Established patient, here for evaluation of the following chief complaint(s):  Follow-up         ASSESSMENT/PLAN:      Diagnosis Orders   1. Essential hypertension, benign     2. Need for influenza vaccination         HTN  Blood pressure is good  continue   lisinopril  20 mg daily   Advised weight loss    Subjective   SUBJECTIVE/OBJECTIVE:  HPI  Is here for follow up of HTN. Patient's BP is controlled on current medications. He is doing well. Review of Systems   Constitutional: Negative. Negative for fatigue and fever. Respiratory: Negative for cough, chest tightness, shortness of breath and wheezing. Cardiovascular: Negative for chest pain and palpitations. Gastrointestinal: Negative for abdominal pain, blood in stool, diarrhea, nausea and vomiting. Objective   Physical Exam  Constitutional:       Appearance: He is well-developed. HENT:      Head: Normocephalic and atraumatic. Eyes:      Pupils: Pupils are equal, round, and reactive to light. Neck:      Thyroid: No thyromegaly. Cardiovascular:      Rate and Rhythm: Normal rate and regular rhythm. Heart sounds: Normal heart sounds. No murmur heard. No friction rub. No gallop. Comments: No carotid bruit  Pulmonary:      Effort: Pulmonary effort is normal. No respiratory distress. Breath sounds: Normal breath sounds. No wheezing or rales. Chest:      Chest wall: No tenderness. Abdominal:      General: Bowel sounds are normal. There is no distension. Palpations: Abdomen is soft. There is no mass. Tenderness: There is no abdominal tenderness. There is no guarding or rebound. Musculoskeletal:      Cervical back: Normal range of motion and neck supple. Neurological:      Mental Status: He is alert and oriented to person, place, and time. An electronic signature was used to authenticate this note.     --Ilan Cruz MD

## 2022-03-03 ENCOUNTER — TELEPHONE (OUTPATIENT)
Dept: INTERNAL MEDICINE CLINIC | Age: 57
End: 2022-03-03

## 2022-03-03 NOTE — TELEPHONE ENCOUNTER
----- Message from Nicole Carrington MD sent at 3/3/2022  8:05 AM EST -----  Contact: Elaine Arrington [phone]  Yes   ----- Message -----  From: Vanessa Parson  Sent: 3/2/2022   4:59 PM EST  To: Nicole Carrington MD    Patient is asking if he can take 2000 mg of fish oil a day. It is what the directions say on the bottle he bought from [facility].      Thank you

## 2022-05-16 RX ORDER — LISINOPRIL 20 MG/1
TABLET ORAL
Qty: 90 TABLET | Refills: 0 | Status: SHIPPED | OUTPATIENT
Start: 2022-05-16 | End: 2022-08-02 | Stop reason: SDUPTHER

## 2022-08-02 ENCOUNTER — OFFICE VISIT (OUTPATIENT)
Dept: INTERNAL MEDICINE CLINIC | Age: 57
End: 2022-08-02

## 2022-08-02 VITALS
DIASTOLIC BLOOD PRESSURE: 80 MMHG | BODY MASS INDEX: 31.42 KG/M2 | HEART RATE: 76 BPM | WEIGHT: 232 LBS | HEIGHT: 72 IN | SYSTOLIC BLOOD PRESSURE: 122 MMHG

## 2022-08-02 DIAGNOSIS — Z00.00 ROUTINE GENERAL MEDICAL EXAMINATION AT A HEALTH CARE FACILITY: ICD-10-CM

## 2022-08-02 DIAGNOSIS — I10 ESSENTIAL HYPERTENSION, BENIGN: ICD-10-CM

## 2022-08-02 DIAGNOSIS — I10 ESSENTIAL HYPERTENSION, BENIGN: Primary | ICD-10-CM

## 2022-08-02 PROCEDURE — 99213 OFFICE O/P EST LOW 20 MIN: CPT | Performed by: INTERNAL MEDICINE

## 2022-08-02 RX ORDER — LISINOPRIL 20 MG/1
TABLET ORAL
Qty: 90 TABLET | Refills: 1 | Status: SHIPPED | OUTPATIENT
Start: 2022-08-02

## 2022-08-02 ASSESSMENT — ENCOUNTER SYMPTOMS
SHORTNESS OF BREATH: 0
WHEEZING: 0
BLOOD IN STOOL: 0
VOMITING: 0
DIARRHEA: 0
CHEST TIGHTNESS: 0
NAUSEA: 0
ABDOMINAL PAIN: 0
COUGH: 0

## 2022-08-02 ASSESSMENT — PATIENT HEALTH QUESTIONNAIRE - PHQ9
SUM OF ALL RESPONSES TO PHQ QUESTIONS 1-9: 0
2. FEELING DOWN, DEPRESSED OR HOPELESS: 0
SUM OF ALL RESPONSES TO PHQ QUESTIONS 1-9: 0
SUM OF ALL RESPONSES TO PHQ9 QUESTIONS 1 & 2: 0
1. LITTLE INTEREST OR PLEASURE IN DOING THINGS: 0
SUM OF ALL RESPONSES TO PHQ QUESTIONS 1-9: 0
SUM OF ALL RESPONSES TO PHQ QUESTIONS 1-9: 0

## 2022-08-03 LAB
A/G RATIO: 1.7 (ref 1.1–2.2)
ALBUMIN SERPL-MCNC: 4.8 G/DL (ref 3.4–5)
ALP BLD-CCNC: 72 U/L (ref 40–129)
ALT SERPL-CCNC: 23 U/L (ref 10–40)
ANION GAP SERPL CALCULATED.3IONS-SCNC: 16 MMOL/L (ref 3–16)
AST SERPL-CCNC: 21 U/L (ref 15–37)
BASOPHILS ABSOLUTE: 0 K/UL (ref 0–0.2)
BASOPHILS RELATIVE PERCENT: 0.7 %
BILIRUB SERPL-MCNC: 0.8 MG/DL (ref 0–1)
BUN BLDV-MCNC: 14 MG/DL (ref 7–20)
CALCIUM SERPL-MCNC: 9.6 MG/DL (ref 8.3–10.6)
CHLORIDE BLD-SCNC: 99 MMOL/L (ref 99–110)
CHOLESTEROL, TOTAL: 196 MG/DL (ref 0–199)
CO2: 24 MMOL/L (ref 21–32)
CREAT SERPL-MCNC: 1 MG/DL (ref 0.9–1.3)
EOSINOPHILS ABSOLUTE: 0.1 K/UL (ref 0–0.6)
EOSINOPHILS RELATIVE PERCENT: 1 %
ESTIMATED AVERAGE GLUCOSE: 114 MG/DL
GFR AFRICAN AMERICAN: >60
GFR NON-AFRICAN AMERICAN: >60
GLUCOSE BLD-MCNC: 95 MG/DL (ref 70–99)
HBA1C MFR BLD: 5.6 %
HCT VFR BLD CALC: 47.6 % (ref 40.5–52.5)
HDLC SERPL-MCNC: 51 MG/DL (ref 40–60)
HEMOGLOBIN: 15.9 G/DL (ref 13.5–17.5)
LDL CHOLESTEROL CALCULATED: 122 MG/DL
LYMPHOCYTES ABSOLUTE: 1.8 K/UL (ref 1–5.1)
LYMPHOCYTES RELATIVE PERCENT: 33.3 %
MCH RBC QN AUTO: 29.4 PG (ref 26–34)
MCHC RBC AUTO-ENTMCNC: 33.5 G/DL (ref 31–36)
MCV RBC AUTO: 87.8 FL (ref 80–100)
MONOCYTES ABSOLUTE: 0.4 K/UL (ref 0–1.3)
MONOCYTES RELATIVE PERCENT: 6.9 %
NEUTROPHILS ABSOLUTE: 3.2 K/UL (ref 1.7–7.7)
NEUTROPHILS RELATIVE PERCENT: 58.1 %
PDW BLD-RTO: 13.1 % (ref 12.4–15.4)
PLATELET # BLD: 275 K/UL (ref 135–450)
PMV BLD AUTO: 8.3 FL (ref 5–10.5)
POTASSIUM SERPL-SCNC: 4.3 MMOL/L (ref 3.5–5.1)
PROSTATE SPECIFIC ANTIGEN: 1.1 NG/ML (ref 0–4)
RBC # BLD: 5.42 M/UL (ref 4.2–5.9)
SODIUM BLD-SCNC: 139 MMOL/L (ref 136–145)
TOTAL PROTEIN: 7.7 G/DL (ref 6.4–8.2)
TRIGL SERPL-MCNC: 116 MG/DL (ref 0–150)
VLDLC SERPL CALC-MCNC: 23 MG/DL
WBC # BLD: 5.5 K/UL (ref 4–11)

## 2022-08-08 ENCOUNTER — NURSE ONLY (OUTPATIENT)
Dept: INTERNAL MEDICINE CLINIC | Age: 57
End: 2022-08-08

## 2022-08-08 DIAGNOSIS — Z12.11 COLON CANCER SCREENING: Primary | ICD-10-CM

## 2022-08-08 LAB
CONTROL: NORMAL
HEMOCCULT STL QL: NORMAL

## 2022-08-08 PROCEDURE — 82274 ASSAY TEST FOR BLOOD FECAL: CPT | Performed by: INTERNAL MEDICINE

## 2023-02-08 ENCOUNTER — TELEPHONE (OUTPATIENT)
Dept: INTERNAL MEDICINE CLINIC | Age: 58
End: 2023-02-08

## 2023-02-08 RX ORDER — LISINOPRIL 20 MG/1
TABLET ORAL
Qty: 90 TABLET | Refills: 1 | Status: SHIPPED | OUTPATIENT
Start: 2023-02-08

## 2023-02-08 NOTE — TELEPHONE ENCOUNTER
----- Message from Michelle Driver sent at 2/8/2023 11:37 AM EST -----  Contact: Jaguar Marcano  Patient needs refill 90 day supply.    Appt made 2-23-23    lisinopril (PRINIVIL;ZESTRIL) 20 MG tablet       [address] [place] , [address] [phone] [facility] [phone] [address] [address]   Phone:  [phone]  Fax:  [phone]

## 2023-02-23 ENCOUNTER — OFFICE VISIT (OUTPATIENT)
Dept: INTERNAL MEDICINE CLINIC | Age: 58
End: 2023-02-23

## 2023-02-23 VITALS
BODY MASS INDEX: 33.05 KG/M2 | WEIGHT: 244 LBS | HEIGHT: 72 IN | DIASTOLIC BLOOD PRESSURE: 80 MMHG | SYSTOLIC BLOOD PRESSURE: 122 MMHG | HEART RATE: 84 BPM

## 2023-02-23 DIAGNOSIS — I10 ESSENTIAL HYPERTENSION, BENIGN: ICD-10-CM

## 2023-02-23 DIAGNOSIS — I10 ESSENTIAL HYPERTENSION, BENIGN: Primary | ICD-10-CM

## 2023-02-23 DIAGNOSIS — R60.0 PEDAL EDEMA: ICD-10-CM

## 2023-02-23 PROCEDURE — 3074F SYST BP LT 130 MM HG: CPT | Performed by: INTERNAL MEDICINE

## 2023-02-23 PROCEDURE — 99213 OFFICE O/P EST LOW 20 MIN: CPT | Performed by: INTERNAL MEDICINE

## 2023-02-23 PROCEDURE — 3079F DIAST BP 80-89 MM HG: CPT | Performed by: INTERNAL MEDICINE

## 2023-02-23 ASSESSMENT — ENCOUNTER SYMPTOMS
DIARRHEA: 0
WHEEZING: 0
ABDOMINAL PAIN: 0
NAUSEA: 0
COUGH: 0
VOMITING: 0
BLOOD IN STOOL: 0
SHORTNESS OF BREATH: 0
CHEST TIGHTNESS: 0

## 2023-02-23 NOTE — PROGRESS NOTES
Gio Venegas (:  1965) is a 56 y.o. male,Established patient, here for evaluation of the following chief complaint(s):  No chief complaint on file. ASSESSMENT/PLAN:        Diagnosis Orders   1. Essential hypertension, benign  Renal Function Panel      2. Pedal edema  Renal Function Panel          HTN  Blood pressure is good  continue   lisinopril  20 mg daily  Advised weight loss    Trace edema   Reassured   Renal profile     Subjective   SUBJECTIVE/OBJECTIVE:  HPI  Is here for follow up of HTN. Patient's BP is controlled on current medications. He is doing well. Review of Systems   Constitutional: Negative. Negative for fatigue and fever. Respiratory:  Negative for cough, chest tightness, shortness of breath and wheezing. Cardiovascular:  Positive for leg swelling. Negative for chest pain and palpitations. Gastrointestinal:  Negative for abdominal pain, blood in stool, diarrhea, nausea and vomiting. Objective   Physical Exam  Constitutional:       Appearance: He is well-developed. HENT:      Head: Normocephalic and atraumatic. Eyes:      Pupils: Pupils are equal, round, and reactive to light. Neck:      Thyroid: No thyromegaly. Cardiovascular:      Rate and Rhythm: Normal rate and regular rhythm. Heart sounds: Normal heart sounds. No murmur heard. No friction rub. No gallop. Comments: No carotid bruit  Pulmonary:      Effort: Pulmonary effort is normal. No respiratory distress. Breath sounds: Normal breath sounds. No wheezing or rales. Chest:      Chest wall: No tenderness. Abdominal:      General: Bowel sounds are normal. There is no distension. Palpations: Abdomen is soft. There is no mass. Tenderness: There is no abdominal tenderness. There is no guarding or rebound. Musculoskeletal:      Cervical back: Normal range of motion and neck supple.       Comments: Trace edema   Neurological:      Mental Status: He is alert and oriented to person, place, and time. An electronic signature was used to authenticate this note.     --Andrew Perez MD

## 2023-02-24 LAB
ALBUMIN SERPL-MCNC: 4.8 G/DL (ref 3.4–5)
ANION GAP SERPL CALCULATED.3IONS-SCNC: 16 MMOL/L (ref 3–16)
BUN BLDV-MCNC: 14 MG/DL (ref 7–20)
CALCIUM SERPL-MCNC: 9.9 MG/DL (ref 8.3–10.6)
CHLORIDE BLD-SCNC: 100 MMOL/L (ref 99–110)
CO2: 24 MMOL/L (ref 21–32)
CREAT SERPL-MCNC: 1.2 MG/DL (ref 0.9–1.3)
GFR SERPL CREATININE-BSD FRML MDRD: >60 ML/MIN/{1.73_M2}
GLUCOSE BLD-MCNC: 83 MG/DL (ref 70–99)
PHOSPHORUS: 3.6 MG/DL (ref 2.5–4.9)
POTASSIUM SERPL-SCNC: 4.6 MMOL/L (ref 3.5–5.1)
SODIUM BLD-SCNC: 140 MMOL/L (ref 136–145)

## 2023-03-23 NOTE — TELEPHONE ENCOUNTER
LM to call back. Office/Clinical staff confirmed the PA/PDL paperwork has been faxed to the pharmacy. It is the responsibility of the filling pharmacy to obtain the prior auth. If the office has questions regarding this PA, please contact the patients pharmacy directly.    The EPA team will close out of this encounter at this time.

## 2023-08-03 RX ORDER — LISINOPRIL 20 MG/1
TABLET ORAL
Qty: 30 TABLET | Refills: 0 | Status: SHIPPED | OUTPATIENT
Start: 2023-08-03 | End: 2023-08-08 | Stop reason: SDUPTHER

## 2023-08-08 ENCOUNTER — TELEPHONE (OUTPATIENT)
Dept: INTERNAL MEDICINE CLINIC | Age: 58
End: 2023-08-08

## 2023-08-08 RX ORDER — LISINOPRIL 20 MG/1
TABLET ORAL
Qty: 90 TABLET | Refills: 0 | Status: SHIPPED | OUTPATIENT
Start: 2023-08-08

## 2023-09-07 ENCOUNTER — OFFICE VISIT (OUTPATIENT)
Dept: INTERNAL MEDICINE CLINIC | Age: 58
End: 2023-09-07

## 2023-09-07 VITALS
HEIGHT: 72 IN | SYSTOLIC BLOOD PRESSURE: 142 MMHG | HEART RATE: 72 BPM | BODY MASS INDEX: 32.51 KG/M2 | WEIGHT: 240 LBS | DIASTOLIC BLOOD PRESSURE: 90 MMHG

## 2023-09-07 DIAGNOSIS — I10 ESSENTIAL HYPERTENSION, BENIGN: ICD-10-CM

## 2023-09-07 DIAGNOSIS — I10 ESSENTIAL HYPERTENSION, BENIGN: Primary | ICD-10-CM

## 2023-09-07 DIAGNOSIS — Z00.00 ROUTINE GENERAL MEDICAL EXAMINATION AT A HEALTH CARE FACILITY: ICD-10-CM

## 2023-09-07 LAB
ALBUMIN SERPL-MCNC: 4.6 G/DL (ref 3.4–5)
ALBUMIN/GLOB SERPL: 1.6 {RATIO} (ref 1.1–2.2)
ALP SERPL-CCNC: 75 U/L (ref 40–129)
ALT SERPL-CCNC: 24 U/L (ref 10–40)
ANION GAP SERPL CALCULATED.3IONS-SCNC: 13 MMOL/L (ref 3–16)
AST SERPL-CCNC: 20 U/L (ref 15–37)
BASOPHILS # BLD: 0.1 K/UL (ref 0–0.2)
BASOPHILS NFR BLD: 1 %
BILIRUB SERPL-MCNC: 0.3 MG/DL (ref 0–1)
BUN SERPL-MCNC: 14 MG/DL (ref 7–20)
CALCIUM SERPL-MCNC: 9.6 MG/DL (ref 8.3–10.6)
CHLORIDE SERPL-SCNC: 103 MMOL/L (ref 99–110)
CHOLEST SERPL-MCNC: 174 MG/DL (ref 0–199)
CO2 SERPL-SCNC: 26 MMOL/L (ref 21–32)
CREAT SERPL-MCNC: 1.1 MG/DL (ref 0.9–1.3)
DEPRECATED RDW RBC AUTO: 13.2 % (ref 12.4–15.4)
EOSINOPHIL # BLD: 0.1 K/UL (ref 0–0.6)
EOSINOPHIL NFR BLD: 1.5 %
GFR SERPLBLD CREATININE-BSD FMLA CKD-EPI: >60 ML/MIN/{1.73_M2}
GLUCOSE SERPL-MCNC: 97 MG/DL (ref 70–99)
HCT VFR BLD AUTO: 41.7 % (ref 40.5–52.5)
HDLC SERPL-MCNC: 50 MG/DL (ref 40–60)
HGB BLD-MCNC: 14.6 G/DL (ref 13.5–17.5)
LDLC SERPL CALC-MCNC: 107 MG/DL
LYMPHOCYTES # BLD: 2 K/UL (ref 1–5.1)
LYMPHOCYTES NFR BLD: 32.8 %
MCH RBC QN AUTO: 30.5 PG (ref 26–34)
MCHC RBC AUTO-ENTMCNC: 35 G/DL (ref 31–36)
MCV RBC AUTO: 87.2 FL (ref 80–100)
MONOCYTES # BLD: 0.4 K/UL (ref 0–1.3)
MONOCYTES NFR BLD: 6.1 %
NEUTROPHILS # BLD: 3.5 K/UL (ref 1.7–7.7)
NEUTROPHILS NFR BLD: 58.6 %
PLATELET # BLD AUTO: 268 K/UL (ref 135–450)
PMV BLD AUTO: 9 FL (ref 5–10.5)
POTASSIUM SERPL-SCNC: 4.5 MMOL/L (ref 3.5–5.1)
PROT SERPL-MCNC: 7.5 G/DL (ref 6.4–8.2)
PSA SERPL DL<=0.01 NG/ML-MCNC: 1.05 NG/ML (ref 0–4)
RBC # BLD AUTO: 4.78 M/UL (ref 4.2–5.9)
SODIUM SERPL-SCNC: 142 MMOL/L (ref 136–145)
TRIGL SERPL-MCNC: 85 MG/DL (ref 0–150)
VLDLC SERPL CALC-MCNC: 17 MG/DL
WBC # BLD AUTO: 5.9 K/UL (ref 4–11)

## 2023-09-07 PROCEDURE — 3080F DIAST BP >= 90 MM HG: CPT | Performed by: INTERNAL MEDICINE

## 2023-09-07 PROCEDURE — 3077F SYST BP >= 140 MM HG: CPT | Performed by: INTERNAL MEDICINE

## 2023-09-07 PROCEDURE — 99213 OFFICE O/P EST LOW 20 MIN: CPT | Performed by: INTERNAL MEDICINE

## 2023-09-07 RX ORDER — LISINOPRIL 20 MG/1
TABLET ORAL
Qty: 90 TABLET | Refills: 1 | Status: SHIPPED | OUTPATIENT
Start: 2023-09-07

## 2023-09-07 ASSESSMENT — PATIENT HEALTH QUESTIONNAIRE - PHQ9
2. FEELING DOWN, DEPRESSED OR HOPELESS: 0
SUM OF ALL RESPONSES TO PHQ9 QUESTIONS 1 & 2: 0
SUM OF ALL RESPONSES TO PHQ QUESTIONS 1-9: 0
SUM OF ALL RESPONSES TO PHQ QUESTIONS 1-9: 0
1. LITTLE INTEREST OR PLEASURE IN DOING THINGS: 0
SUM OF ALL RESPONSES TO PHQ QUESTIONS 1-9: 0
SUM OF ALL RESPONSES TO PHQ QUESTIONS 1-9: 0

## 2023-09-07 ASSESSMENT — ENCOUNTER SYMPTOMS
CHEST TIGHTNESS: 0
NAUSEA: 0
DIARRHEA: 0
BLOOD IN STOOL: 0
SHORTNESS OF BREATH: 0
VOMITING: 0
ABDOMINAL PAIN: 0
WHEEZING: 0
COUGH: 0

## 2023-09-07 NOTE — PROGRESS NOTES
Jose M Boone (:  1965) is a 56 y.o. male,Established patient, here for evaluation of the following chief complaint(s):  Follow-up         ASSESSMENT/PLAN:  1. Essential hypertension, benign  -     CBC with Auto Differential; Future  -     Comprehensive Metabolic Panel; Future  2. Routine general medical examination at a health care facility  -     CBC with Auto Differential; Future  -     Comprehensive Metabolic Panel; Future  -     PSA, Prostatic Specific Antigen; Future  -     Lipid Panel; Future  -     Hemoglobin A1C; Future              HTN  Blood pressure is mildly elevated   continue   lisinopril  20 mg daily  Advised weight loss    FIT test     Subjective   SUBJECTIVE/OBJECTIVE:  HPI  Is here for follow up of HTN. Patient's BP is controlled on current medications. He is doing well. Review of Systems   Constitutional: Negative. Negative for fatigue and fever. Respiratory:  Negative for cough, chest tightness, shortness of breath and wheezing. Cardiovascular:  Negative for chest pain and palpitations. Gastrointestinal:  Negative for abdominal pain, blood in stool, diarrhea, nausea and vomiting. Objective   Physical Exam  Constitutional:       Appearance: He is well-developed. HENT:      Head: Normocephalic and atraumatic. Eyes:      Pupils: Pupils are equal, round, and reactive to light. Neck:      Thyroid: No thyromegaly. Cardiovascular:      Rate and Rhythm: Normal rate and regular rhythm. Heart sounds: Normal heart sounds. No murmur heard. No friction rub. No gallop. Comments: No carotid bruit  Pulmonary:      Effort: Pulmonary effort is normal. No respiratory distress. Breath sounds: Normal breath sounds. No wheezing or rales. Chest:      Chest wall: No tenderness. Abdominal:      General: Bowel sounds are normal. There is no distension. Palpations: Abdomen is soft. There is no mass. Tenderness: There is no abdominal tenderness.

## 2023-09-08 LAB
EST. AVERAGE GLUCOSE BLD GHB EST-MCNC: 114 MG/DL
HBA1C MFR BLD: 5.6 %

## 2023-09-18 ENCOUNTER — NURSE ONLY (OUTPATIENT)
Dept: INTERNAL MEDICINE CLINIC | Age: 58
End: 2023-09-18

## 2023-09-18 DIAGNOSIS — Z12.11 COLON CANCER SCREENING: Primary | ICD-10-CM

## 2023-09-18 LAB
CONTROL: NORMAL
FECAL BLOOD IMMUNOCHEMICAL TEST: NORMAL

## 2023-09-18 PROCEDURE — 82274 ASSAY TEST FOR BLOOD FECAL: CPT | Performed by: INTERNAL MEDICINE

## 2024-03-07 ENCOUNTER — OFFICE VISIT (OUTPATIENT)
Dept: INTERNAL MEDICINE CLINIC | Age: 59
End: 2024-03-07

## 2024-03-07 VITALS
BODY MASS INDEX: 31.42 KG/M2 | HEART RATE: 76 BPM | HEIGHT: 72 IN | SYSTOLIC BLOOD PRESSURE: 152 MMHG | DIASTOLIC BLOOD PRESSURE: 92 MMHG | WEIGHT: 232 LBS

## 2024-03-07 DIAGNOSIS — I10 ESSENTIAL HYPERTENSION, BENIGN: Primary | ICD-10-CM

## 2024-03-07 PROCEDURE — 99213 OFFICE O/P EST LOW 20 MIN: CPT | Performed by: INTERNAL MEDICINE

## 2024-03-07 PROCEDURE — 3077F SYST BP >= 140 MM HG: CPT | Performed by: INTERNAL MEDICINE

## 2024-03-07 PROCEDURE — 3080F DIAST BP >= 90 MM HG: CPT | Performed by: INTERNAL MEDICINE

## 2024-03-07 RX ORDER — LISINOPRIL 40 MG/1
TABLET ORAL
Qty: 90 TABLET | Refills: 0 | Status: SHIPPED | OUTPATIENT
Start: 2024-03-07

## 2024-03-07 ASSESSMENT — ENCOUNTER SYMPTOMS
SHORTNESS OF BREATH: 0
CHEST TIGHTNESS: 0
COUGH: 0
WHEEZING: 0
DIARRHEA: 0
NAUSEA: 0
ABDOMINAL PAIN: 0
BLOOD IN STOOL: 0
VOMITING: 0

## 2024-03-07 ASSESSMENT — PATIENT HEALTH QUESTIONNAIRE - PHQ9
SUM OF ALL RESPONSES TO PHQ QUESTIONS 1-9: 0
1. LITTLE INTEREST OR PLEASURE IN DOING THINGS: 0
SUM OF ALL RESPONSES TO PHQ QUESTIONS 1-9: 0
2. FEELING DOWN, DEPRESSED OR HOPELESS: 0
SUM OF ALL RESPONSES TO PHQ9 QUESTIONS 1 & 2: 0
SUM OF ALL RESPONSES TO PHQ QUESTIONS 1-9: 0
SUM OF ALL RESPONSES TO PHQ QUESTIONS 1-9: 0

## 2024-03-07 NOTE — PROGRESS NOTES
Irwin Dillon (:  1965) is a 58 y.o. male,Established patient, here for evaluation of the following chief complaint(s):  Follow-up         ASSESSMENT/PLAN:        Diagnosis Orders   1. Essential hypertension, benign              HTN  Blood pressure is elevated   Increase lisinopril  to 40 mg daily  Advised weight loss       Subjective   SUBJECTIVE/OBJECTIVE:  HPI  Is here for follow up of HTN.  Patient's BP is controlled on current medications.  He is doing well.    Review of Systems   Constitutional: Negative.  Negative for fatigue and fever.   Respiratory:  Negative for cough, chest tightness, shortness of breath and wheezing.    Cardiovascular:  Negative for chest pain and palpitations.   Gastrointestinal:  Negative for abdominal pain, blood in stool, diarrhea, nausea and vomiting.          Objective   Physical Exam  Constitutional:       Appearance: He is well-developed.   HENT:      Head: Normocephalic and atraumatic.   Eyes:      Pupils: Pupils are equal, round, and reactive to light.   Neck:      Thyroid: No thyromegaly.   Cardiovascular:      Rate and Rhythm: Normal rate and regular rhythm.      Heart sounds: Normal heart sounds. No murmur heard.     No friction rub. No gallop.      Comments: No carotid bruit  Pulmonary:      Effort: Pulmonary effort is normal. No respiratory distress.      Breath sounds: Normal breath sounds. No wheezing or rales.   Chest:      Chest wall: No tenderness.   Musculoskeletal:      Cervical back: Normal range of motion and neck supple.   Neurological:      Mental Status: He is alert and oriented to person, place, and time.                  An electronic signature was used to authenticate this note.    --LIBIA BURTON MD

## 2024-03-15 ENCOUNTER — APPOINTMENT (OUTPATIENT)
Dept: GENERAL RADIOLOGY | Age: 59
End: 2024-03-15
Payer: MEDICARE

## 2024-03-15 ENCOUNTER — APPOINTMENT (OUTPATIENT)
Dept: CT IMAGING | Age: 59
End: 2024-03-15
Payer: MEDICARE

## 2024-03-15 ENCOUNTER — HOSPITAL ENCOUNTER (EMERGENCY)
Age: 59
Discharge: ANOTHER HOSPITAL | End: 2024-03-15
Attending: STUDENT IN AN ORGANIZED HEALTH CARE EDUCATION/TRAINING PROGRAM
Payer: MEDICARE

## 2024-03-15 DIAGNOSIS — R29.90 STROKE-LIKE SYMPTOMS: Primary | ICD-10-CM

## 2024-03-15 LAB
ALBUMIN SERPL-MCNC: 4.5 G/DL (ref 3.4–5)
ALBUMIN/GLOB SERPL: 1.6 {RATIO} (ref 1.1–2.2)
ALP SERPL-CCNC: 78 U/L (ref 40–129)
ALT SERPL-CCNC: 12 U/L (ref 10–40)
ANION GAP SERPL CALCULATED.3IONS-SCNC: 11 MMOL/L (ref 3–16)
AST SERPL-CCNC: 27 U/L (ref 15–37)
BASOPHILS # BLD: 0 K/UL (ref 0–0.2)
BASOPHILS NFR BLD: 0.6 %
BILIRUB SERPL-MCNC: 0.3 MG/DL (ref 0–1)
BUN SERPL-MCNC: 10 MG/DL (ref 7–20)
CALCIUM SERPL-MCNC: 8.8 MG/DL (ref 8.3–10.6)
CHLORIDE SERPL-SCNC: 98 MMOL/L (ref 99–110)
CO2 SERPL-SCNC: 25 MMOL/L (ref 21–32)
CREAT SERPL-MCNC: 1 MG/DL (ref 0.9–1.3)
DEPRECATED RDW RBC AUTO: 12.7 % (ref 12.4–15.4)
EOSINOPHIL # BLD: 0.1 K/UL (ref 0–0.6)
EOSINOPHIL NFR BLD: 1.4 %
GFR SERPLBLD CREATININE-BSD FMLA CKD-EPI: >60 ML/MIN/{1.73_M2}
GLUCOSE BLD-MCNC: 87 MG/DL (ref 70–99)
GLUCOSE SERPL-MCNC: 88 MG/DL (ref 70–99)
HCT VFR BLD AUTO: 44.2 % (ref 40.5–52.5)
HGB BLD-MCNC: 14.9 G/DL (ref 13.5–17.5)
INR PPP: 0.99 (ref 0.84–1.16)
LYMPHOCYTES # BLD: 2.8 K/UL (ref 1–5.1)
LYMPHOCYTES NFR BLD: 32.1 %
MCH RBC QN AUTO: 29.4 PG (ref 26–34)
MCHC RBC AUTO-ENTMCNC: 33.8 G/DL (ref 31–36)
MCV RBC AUTO: 86.8 FL (ref 80–100)
MONOCYTES # BLD: 0.7 K/UL (ref 0–1.3)
MONOCYTES NFR BLD: 8.6 %
NEUTROPHILS # BLD: 5 K/UL (ref 1.7–7.7)
NEUTROPHILS NFR BLD: 57.3 %
PERFORMED ON: NORMAL
PLATELET # BLD AUTO: 291 K/UL (ref 135–450)
PMV BLD AUTO: 8 FL (ref 5–10.5)
POTASSIUM SERPL-SCNC: 4.7 MMOL/L (ref 3.5–5.1)
PROT SERPL-MCNC: 7.4 G/DL (ref 6.4–8.2)
PROTHROMBIN TIME: 13.1 SEC (ref 11.5–14.8)
RBC # BLD AUTO: 5.09 M/UL (ref 4.2–5.9)
SODIUM SERPL-SCNC: 134 MMOL/L (ref 136–145)
TROPONIN, HIGH SENSITIVITY: 10 NG/L (ref 0–22)
WBC # BLD AUTO: 8.7 K/UL (ref 4–11)

## 2024-03-15 PROCEDURE — 85610 PROTHROMBIN TIME: CPT

## 2024-03-15 PROCEDURE — 6370000000 HC RX 637 (ALT 250 FOR IP): Performed by: STUDENT IN AN ORGANIZED HEALTH CARE EDUCATION/TRAINING PROGRAM

## 2024-03-15 PROCEDURE — 84484 ASSAY OF TROPONIN QUANT: CPT

## 2024-03-15 PROCEDURE — 70450 CT HEAD/BRAIN W/O DYE: CPT

## 2024-03-15 PROCEDURE — 85025 COMPLETE CBC W/AUTO DIFF WBC: CPT

## 2024-03-15 PROCEDURE — 80053 COMPREHEN METABOLIC PANEL: CPT

## 2024-03-15 PROCEDURE — 6360000004 HC RX CONTRAST MEDICATION: Performed by: STUDENT IN AN ORGANIZED HEALTH CARE EDUCATION/TRAINING PROGRAM

## 2024-03-15 PROCEDURE — 36415 COLL VENOUS BLD VENIPUNCTURE: CPT

## 2024-03-15 PROCEDURE — 99285 EMERGENCY DEPT VISIT HI MDM: CPT

## 2024-03-15 PROCEDURE — 71045 X-RAY EXAM CHEST 1 VIEW: CPT

## 2024-03-15 PROCEDURE — 93005 ELECTROCARDIOGRAM TRACING: CPT | Performed by: STUDENT IN AN ORGANIZED HEALTH CARE EDUCATION/TRAINING PROGRAM

## 2024-03-15 PROCEDURE — 70498 CT ANGIOGRAPHY NECK: CPT

## 2024-03-15 RX ORDER — ASPIRIN 81 MG/1
324 TABLET, CHEWABLE ORAL ONCE
Status: COMPLETED | OUTPATIENT
Start: 2024-03-15 | End: 2024-03-15

## 2024-03-15 RX ORDER — CLOPIDOGREL BISULFATE 75 MG/1
300 TABLET ORAL ONCE
Status: COMPLETED | OUTPATIENT
Start: 2024-03-15 | End: 2024-03-15

## 2024-03-15 RX ADMIN — ASPIRIN 324 MG: 81 TABLET, CHEWABLE ORAL at 22:06

## 2024-03-15 RX ADMIN — CLOPIDOGREL BISULFATE 300 MG: 75 TABLET ORAL at 22:05

## 2024-03-15 RX ADMIN — IOPAMIDOL 85 ML: 755 INJECTION, SOLUTION INTRAVENOUS at 20:37

## 2024-03-15 ASSESSMENT — LIFESTYLE VARIABLES
HOW OFTEN DO YOU HAVE A DRINK CONTAINING ALCOHOL: NEVER
HOW MANY STANDARD DRINKS CONTAINING ALCOHOL DO YOU HAVE ON A TYPICAL DAY: PATIENT DOES NOT DRINK

## 2024-03-15 ASSESSMENT — PAIN - FUNCTIONAL ASSESSMENT
PAIN_FUNCTIONAL_ASSESSMENT: NONE - DENIES PAIN
PAIN_FUNCTIONAL_ASSESSMENT: NONE - DENIES PAIN

## 2024-03-16 ENCOUNTER — HOSPITAL ENCOUNTER (INPATIENT)
Age: 59
LOS: 10 days | Discharge: HOME HEALTH | DRG: 065 | End: 2024-03-26
Attending: INTERNAL MEDICINE | Admitting: INTERNAL MEDICINE
Payer: MEDICARE

## 2024-03-16 VITALS
OXYGEN SATURATION: 100 % | RESPIRATION RATE: 18 BRPM | WEIGHT: 234.35 LBS | HEIGHT: 73 IN | BODY MASS INDEX: 31.06 KG/M2 | DIASTOLIC BLOOD PRESSURE: 89 MMHG | HEART RATE: 70 BPM | SYSTOLIC BLOOD PRESSURE: 146 MMHG | TEMPERATURE: 97.6 F

## 2024-03-16 PROBLEM — H91.90 HARD OF HEARING: Status: ACTIVE | Noted: 2024-03-16

## 2024-03-16 PROBLEM — R29.90 STROKE-LIKE SYMPTOM: Status: ACTIVE | Noted: 2024-03-16

## 2024-03-16 PROBLEM — R47.1 DYSARTHRIA: Status: ACTIVE | Noted: 2024-03-16

## 2024-03-16 PROBLEM — R20.0 LEFT SIDED NUMBNESS: Status: ACTIVE | Noted: 2024-03-16

## 2024-03-16 LAB
EKG ATRIAL RATE: 64 BPM
EKG DIAGNOSIS: NORMAL
EKG P AXIS: 12 DEGREES
EKG P-R INTERVAL: 182 MS
EKG Q-T INTERVAL: 420 MS
EKG QRS DURATION: 92 MS
EKG QTC CALCULATION (BAZETT): 433 MS
EKG R AXIS: 5 DEGREES
EKG T AXIS: -1 DEGREES
EKG VENTRICULAR RATE: 64 BPM

## 2024-03-16 PROCEDURE — 97530 THERAPEUTIC ACTIVITIES: CPT

## 2024-03-16 PROCEDURE — 6370000000 HC RX 637 (ALT 250 FOR IP): Performed by: INTERNAL MEDICINE

## 2024-03-16 PROCEDURE — 93010 ELECTROCARDIOGRAM REPORT: CPT | Performed by: INTERNAL MEDICINE

## 2024-03-16 PROCEDURE — 97166 OT EVAL MOD COMPLEX 45 MIN: CPT

## 2024-03-16 PROCEDURE — 97162 PT EVAL MOD COMPLEX 30 MIN: CPT

## 2024-03-16 PROCEDURE — 92526 ORAL FUNCTION THERAPY: CPT

## 2024-03-16 PROCEDURE — 97116 GAIT TRAINING THERAPY: CPT

## 2024-03-16 PROCEDURE — 6370000000 HC RX 637 (ALT 250 FOR IP): Performed by: NEUROMUSCULOSKELETAL MEDICINE & OMM

## 2024-03-16 PROCEDURE — 2580000003 HC RX 258: Performed by: INTERNAL MEDICINE

## 2024-03-16 PROCEDURE — 97535 SELF CARE MNGMENT TRAINING: CPT

## 2024-03-16 PROCEDURE — G0378 HOSPITAL OBSERVATION PER HR: HCPCS

## 2024-03-16 PROCEDURE — 1200000000 HC SEMI PRIVATE

## 2024-03-16 PROCEDURE — 6360000002 HC RX W HCPCS: Performed by: INTERNAL MEDICINE

## 2024-03-16 PROCEDURE — 6370000000 HC RX 637 (ALT 250 FOR IP): Performed by: NURSE PRACTITIONER

## 2024-03-16 PROCEDURE — 92610 EVALUATE SWALLOWING FUNCTION: CPT

## 2024-03-16 PROCEDURE — G0379 DIRECT REFER HOSPITAL OBSERV: HCPCS

## 2024-03-16 PROCEDURE — 99223 1ST HOSP IP/OBS HIGH 75: CPT | Performed by: NEUROMUSCULOSKELETAL MEDICINE & OMM

## 2024-03-16 RX ORDER — DOCUSATE SODIUM 100 MG/1
100 CAPSULE, LIQUID FILLED ORAL 2 TIMES DAILY
Status: DISCONTINUED | OUTPATIENT
Start: 2024-03-16 | End: 2024-03-26 | Stop reason: HOSPADM

## 2024-03-16 RX ORDER — ASPIRIN 300 MG/1
300 SUPPOSITORY RECTAL DAILY
Status: DISCONTINUED | OUTPATIENT
Start: 2024-03-16 | End: 2024-03-19

## 2024-03-16 RX ORDER — SODIUM CHLORIDE 0.9 % (FLUSH) 0.9 %
5-40 SYRINGE (ML) INJECTION EVERY 12 HOURS SCHEDULED
Status: DISCONTINUED | OUTPATIENT
Start: 2024-03-16 | End: 2024-03-26 | Stop reason: HOSPADM

## 2024-03-16 RX ORDER — POLYETHYLENE GLYCOL 3350 17 G/17G
17 POWDER, FOR SOLUTION ORAL DAILY PRN
Status: DISCONTINUED | OUTPATIENT
Start: 2024-03-16 | End: 2024-03-26 | Stop reason: HOSPADM

## 2024-03-16 RX ORDER — CLOPIDOGREL BISULFATE 75 MG/1
75 TABLET ORAL DAILY
Status: DISCONTINUED | OUTPATIENT
Start: 2024-03-16 | End: 2024-03-26 | Stop reason: HOSPADM

## 2024-03-16 RX ORDER — ATORVASTATIN CALCIUM 40 MG/1
40 TABLET, FILM COATED ORAL NIGHTLY
Status: DISCONTINUED | OUTPATIENT
Start: 2024-03-16 | End: 2024-03-26 | Stop reason: HOSPADM

## 2024-03-16 RX ORDER — SODIUM CHLORIDE 9 MG/ML
INJECTION, SOLUTION INTRAVENOUS PRN
Status: DISCONTINUED | OUTPATIENT
Start: 2024-03-16 | End: 2024-03-26 | Stop reason: HOSPADM

## 2024-03-16 RX ORDER — ONDANSETRON 4 MG/1
4 TABLET, ORALLY DISINTEGRATING ORAL EVERY 8 HOURS PRN
Status: DISCONTINUED | OUTPATIENT
Start: 2024-03-16 | End: 2024-03-26 | Stop reason: HOSPADM

## 2024-03-16 RX ORDER — ENOXAPARIN SODIUM 100 MG/ML
30 INJECTION SUBCUTANEOUS 2 TIMES DAILY
Status: DISCONTINUED | OUTPATIENT
Start: 2024-03-16 | End: 2024-03-17

## 2024-03-16 RX ORDER — ONDANSETRON 2 MG/ML
4 INJECTION INTRAMUSCULAR; INTRAVENOUS EVERY 6 HOURS PRN
Status: DISCONTINUED | OUTPATIENT
Start: 2024-03-16 | End: 2024-03-26 | Stop reason: HOSPADM

## 2024-03-16 RX ORDER — ASPIRIN 81 MG/1
81 TABLET, CHEWABLE ORAL DAILY
Status: DISCONTINUED | OUTPATIENT
Start: 2024-03-16 | End: 2024-03-26 | Stop reason: HOSPADM

## 2024-03-16 RX ORDER — SODIUM CHLORIDE 0.9 % (FLUSH) 0.9 %
5-40 SYRINGE (ML) INJECTION PRN
Status: DISCONTINUED | OUTPATIENT
Start: 2024-03-16 | End: 2024-03-26 | Stop reason: HOSPADM

## 2024-03-16 RX ADMIN — Medication 10 ML: at 20:33

## 2024-03-16 RX ADMIN — DOCUSATE SODIUM 100 MG: 100 CAPSULE, LIQUID FILLED ORAL at 20:32

## 2024-03-16 RX ADMIN — ASPIRIN 81 MG 81 MG: 81 TABLET ORAL at 09:15

## 2024-03-16 RX ADMIN — ENOXAPARIN SODIUM 30 MG: 100 INJECTION SUBCUTANEOUS at 20:34

## 2024-03-16 RX ADMIN — ATORVASTATIN CALCIUM 40 MG: 40 TABLET, FILM COATED ORAL at 20:32

## 2024-03-16 RX ADMIN — ENOXAPARIN SODIUM 30 MG: 100 INJECTION SUBCUTANEOUS at 09:14

## 2024-03-16 RX ADMIN — CLOPIDOGREL BISULFATE 75 MG: 75 TABLET ORAL at 12:29

## 2024-03-16 RX ADMIN — Medication 10 ML: at 09:15

## 2024-03-16 NOTE — PLAN OF CARE
Problem: Neurosensory - Adult  Goal: Achieves stable or improved neurological status  3/16/2024 1049 by Chrissie Renee RN  Outcome: Progressing  Flowsheets (Taken 3/16/2024 0915)  Achieves stable or improved neurological status:   Assess for and report changes in neurological status   Maintain blood pressure and fluid volume within ordered parameters to optimize cerebral perfusion and minimize risk of hemorrhage  3/16/2024 0338 by Kelly Ellsworth RN  Outcome: Progressing  Goal: Absence of seizures  3/16/2024 0338 by Kelly Ellsworth RN  Outcome: Progressing  Goal: Remains free of injury related to seizures activity  3/16/2024 0338 by Kelly Ellsworth RN  Outcome: Progressing  Goal: Achieves maximal functionality and self care  3/16/2024 1049 by Chrissie Renee RN  Outcome: Progressing  3/16/2024 0338 by Kelly Ellsworth RN  Outcome: Progressing     TODAY'S DATE:  3/16/2024      Current NIHSS 2      Discussed personal risk factors for Stroke/TIA with patient/family, and ways to reduce the risk for a recurrent stroke.     Patient's personal risk factors which were identified are:   []   Alcohol Abuse: check with your physician before any alcohol consumption.   [x]   Atrial fibrillation: may cause blood clots  []   Drug Abuse: Seek help, talk with your doctor  []   Clotting Disorder  []   Diabetes  []   Family history of stroke or heart disease  [x]   High Blood Pressure/Hypertension: work with your physician  []   High cholesterol: monitor cholesterol levels with your physician  []   Overweight/Obesity: work with your physician for your ideal body weight  []   Physical Inactivity: get regular exercise as directed by your physician  []   Personal history of previous TIA or stroke  []   Poor Diet: decrease salt (sodium) in your diet, follow diet directed by physician  []   Smoking: stop smoking      Reviewed the Following Education with Patient and/or Family:   - Signs and Symptoms of Stroke  - Personal risk factors

## 2024-03-16 NOTE — CONSULTS
Consult Note  Physical Medicine and Rehabilitation    Patient: Irwin Dillon  [id number]  Date: 3/16/2024      Reason for consult/Chief Complaint: L hemiparesis     History of Present Illness/Hospital Course:  Irwin Dillon is a 58 yrs old male with a PMHx of HTN who presented to ED with tingling down left side of body, blurry vision.    Patient originally presented to [facility] ED on 3/15/24 after experiencing L sided paresthesias and numbness, ED found patient dysarthric and with chest pain as well. CT, CTA negative for acute findings, and patient was transferred to [facility] for admission.  Neurology consulted, eval for r/o of lacunar stroke, recommended MRI (Pending at time of consult), cardiology consult, DAPT, statin and Echo.    Physical Medicine and Rehabilitation service consulted for disposition and potential admission to Inpatient rehabilitation unit.    Prior Level of Function:  Previously Independent for mobility, ADLs, and IADLs    Current Level of Function:    OT Assessment 3/16: pt from home with Father, normally independent with IADL's, functional mobility without AD, driving; admitted for Left side numbness & tingling UE & LE; pt now requiring, CGA/min assist with bathroom mobility, standing ADL's; pt to benefit from skilled OT     PT assessment 3/16:Assessment: Patient is a 58 y.o male presenting to hospital w/ deficits in funcitonal mobility compared to baseline level of function d/t impairments in strength, sensation and balance leading to impaired gait quality and increased risk of falls. Pt will continue to benefit from skilled PT services to maximize return to PLOF, reduce risk of falls and allow for safe d/c to IPR level of care when medically appropriate.       SLP Impressions:Th pt was seen this AM for a clinical bedside swallowing evaluation. His nurse approved for the therapist to see the pt this AM. The pt denied any dysphagia symptoms. He also denied any changes in

## 2024-03-16 NOTE — PLAN OF CARE
SLP Evaluation Completed. All note are found in EMR    Geo Rizzo MA, CCC-SLP  SP#[id number]  Speech-Language Pathologist  Phone: [phone]  Speech Desk: [phone]

## 2024-03-16 NOTE — H&P
V2.0  History and Physical      Name:  Irwin Dillon /Age/Sex: 1965  (58 y.o. male)   MRN & CSN:  [id number] & [id number] Encounter Date/Time: 3/16/2024 3:34 AM EDT   Location:  [facility] PCP: Latasha Pritchett MD       Hospital Day: 1    Assessment and Plan:   Irwin Dillon is a 58 y.o. male with a pmh of essential HTN who presents with Stroke-like symptom. He is coming in as a transfert from [facility] where he initially presented on account of acute onset left-sided numbness and tingling concerning for CVA.      Acute onset left-sided numbness/tingling.  Suspected CVA  Hard of hearing.  Dysarthria.  Essential HTN.    Hospital Problems             Last Modified POA    * (Principal) Stroke-like symptom 3/16/2024 Yes    Essential hypertension, benign 3/16/2024 Yes    Left sided numbness 3/16/2024 Yes       Plan:  Admit to intermediate level.  Telemetry.  Holding home dose of Lisinopril to allow for permissive HTN.  Brain MRI, carotid duplex and echocardiogram ordered.  Start ASA and high intensity statin therapy with Lipitor.  Neurology consulted.    Disposition:   Current Living situation: Home  Expected Disposition: Home with home health  Estimated D/C: 3/17    Diet No diet orders on file   DVT Prophylaxis [x] Lovenox, []  Heparin, [] SCDs, [] Ambulation,  [] Eliquis, [] Xarelto, [] Coumadin   Code Status No Order   Surrogate Decision Maker/ POA Unknown     Personally reviewed Lab Studies and Imaging         History from:     patient, electronic medical record    History of Present Illness:     Chief Complaint: Left-sided numbness  Irwin Dillon is a 58 y.o. male with a pmh of essential HTN who presents with Stroke-like symptom. He is coming in as a transfert from [facility] where he initially presented on account of acute onset left-sided numbness and tingling.   The patient is hard of hearing and has dysarthria. It was hard to always understand what he said. Therefore

## 2024-03-16 NOTE — CONSULTS
Inpatient neurology consultation           Pt Name: Irwin Dillon   MRN: [id number]   Birthdate 1965   Date of evaluation 3/16/2024   Provider: Baltazar Lyons MD        Reason for consultation: CVA  Requesting provider: Freedom Jefferson MD      Chief complaint: Left-sided numbness     History of present illness:   58 years old right-handed gentleman presented to the emergency room at the [facility] with acute left-sided arm and leg numbness and weakness and blurring of vision.  There was a possible complaint of substernal chest pain and elevated blood pressure according to 1 family member.  There was no headache or palpitation or dizziness or vertigo and no involvement of his head or face but the numbness seem to affect his left side of trunk and chest wall.    His initial complaint was mostly left-sided paresthesia and numbness heaviness  Of the left side and difficulty with ambulation seem to have developed later  The patient was evaluated by  teleneurology with a low NIH stroke scale score of 1 and was felt to be not a candidate for thrombolysis due to minimal symptoms at presentation his initial head CT and CTA were negative and his blood pressure  Which was initially elevated in the ED at 155/100 normalized while in the emergency room    Patient was started on dual antiplatelet therapy with a bolus of Plavix 300 mg and aspirin 324 mg and was admitted to the hospital for further workup and evaluation.  He was evaluated this morning by speech therapy And no restrictions were recommended    Brain MRI scan is pending    This morning the patient is seated at bedside in no acute distress having his breakfast he continues to complain of tingling and paresthesia of the left side involving mostly left arm and leg left side of the trunk with heaviness sensation of left arm and leg and difficulty with his ambulation and and blurring of vision.  There is no new aphasia or speech

## 2024-03-16 NOTE — ED PROVIDER NOTES
[facility] ED     EMERGENCY DEPARTMENT ENCOUNTER            Pt Name: Irwin Dillon   MRN: [id number]   Birthdate 1965   Date of evaluation: 3/15/2024   Provider: Ami Anderson MD   PCP: Latasha Pritchett MD   Note Started: 8:16 PM EDT 3/15/24          CHIEF COMPLAINT     Chief Complaint   Patient presents with    Tingling     C/o tingling down left side of his body that started at 5:45pm and also c/o blurry vision in both eyes.         HISTORY OF PRESENT ILLNESS:   History from : Patient   Limitations to history : hard of hearing      Irwin Dillon is a 58 y.o. male who presents complaining of left-sided paresthesias and numbness.  Patient states that symptoms started around 545 this evening.  His family member at bedside states that he was complaining of some substernal chest pain as well.  He states that his blood pressures were high.  He denies any headache.  Denies difficulty speaking or ambulating.  Denies any focal weaknesses.  They deny any other complaints or concerns.    Nursing Notes were all reviewed and agreed with, or any disagreements were addressed in the HPI.     REVIEW OF SYSTEMS :    Positives and Pertinent negatives as per HPI.      MEDICAL HISTORY   has a past medical history of Essential hypertension, benign (11/21/06).    No past surgical history on file.   CURRENTMEDICATIONS       Previous Medications    LISINOPRIL (PRINIVIL;ZESTRIL) 40 MG TABLET    TAKE 1 TABLET BY MOUTH ONCE DAILY      SCREENINGS     NIH Stroke Scale  Interval: Reassessment  Level of Consciousness (1a): Alert  LOC Questions (1b): Answers both correctly  LOC Commands (1c): Performs both tasks correctly  Best Gaze (2): Normal  Visual (3): No visual loss  Facial Palsy (4): Normal symmetrical movement  Motor Arm, Left (5a): Drift, but does not hit bed  Motor Arm, Right (5b): No drift  Motor Leg, Left (6a): Drift, but does not hit bed  Motor Leg, Right (6b): No drift  Limb Ataxia  hypertension.  Otherwise reassuring.  NIH is 1 for diminished sensation in the left upper and left lower extremities.  He is otherwise neurologically intact.  Stroke alert was initiated.  I did speak to the stroke team at  who agrees that given minimal symptoms, is not a candidate for TNK.  CT and CTA of the head and neck are performed and were negative for acute concerning findings.  Hypertension improved without treatment throughout course of stay in the ED.  EKG shows no evidence of acute ischemia or dysrhythmias.  Troponin is negative.    With negative imaging, patient was treated with aspirin and Plavix.  Will be transferred to [facility] for further neurologic workup and treatment of strokelike symptoms.       Patient was given the following medications:   Medications   aspirin chewable tablet 324 mg (has no administration in time range)   clopidogrel (PLAVIX) tablet 300 mg (has no administration in time range)   iopamidol (ISOVUE-370) 76 % injection 85 mL (85 mLs IntraVENous Given 3/15/24 2037)        CONSULTS:   None   Discussion with Other Professionals: None   {Social Determinants: None   Chronic Conditions:  None    Records Reviewed: NA      Disposition Considerations: Transferred to [facility]  I am the Primary Clinician of Record.        FINAL IMPRESSION    1. Stroke-like symptoms           DISPOSITION/PLAN:   Transferred to [facility]    PATIENT REFERRED TO:   No follow-up provider specified.     DISCHARGE MEDICATIONS:   New Prescriptions    No medications on file        DISCONTINUED MEDICATIONS:   Discontinued Medications    No medications on file              (Please note that portions of this note were completed with a voice recognition program.  Efforts were made to edit the dictations but occasionally words are mis-transcribed.)       Ami Anderson MD (electronically signed)             Ami Anderson MD  03/15/24 [id number]

## 2024-03-16 NOTE — CARE COORDINATION
CM spoke with father of patient due to patient being extremely [place]. Father asked for CM to speak with patients sister once she arrives today. CM will attempt to speak with patients sister today to get assessment and POC.

## 2024-03-16 NOTE — CARE COORDINATION
Case Management Assessment  Initial Evaluation    Date/Time of Evaluation: 3/16/2024 1:59 PM  Assessment Completed by: Naya Ghosh RN    If patient is discharged prior to next notation, then this note serves as note for discharge by case management.    Patient Name: Irwin Dillon                   YOB: 1965  Diagnosis: Stroke-like symptom [R29.90]  Left sided numbness [R20.0]                   Date / Time: 3/16/2024 12:43 AM    Patient Admission Status: Inpatient   Readmission Risk (Low < 19, Mod (19-27), High > 27): Readmission Risk Score: 7    Current PCP: Latasha Pritchett MD  PCP verified by CM? Yes    Chart Reviewed: Yes      History Provided by: Patient, Child/Family  Patient Orientation: Alert and Oriented    Patient Cognition: Alert    Hospitalization in the last 30 days (Readmission):  No    If yes, Readmission Assessment in CM Navigator will be completed.    Advance Directives:      Code Status: Full Code   Patient's Primary Decision Maker is: Legal Next of Kin      Discharge Planning:    Patient lives with: Family Members Type of Home: House  Primary Care Giver: Self  Patient Support Systems include: Family Members   Current Financial resources:    Current community resources: None  Current services prior to admission: None            Current DME:              Type of Home Care services:  None    ADLS  Prior functional level: Independent in ADLs/IADLs  Current functional level:      PT AM-PAC: 17 /24  OT AM-PAC: 17 /24    Family can provide assistance at DC: Yes  Would you like Case Management to discuss the discharge plan with any other family members/significant others, and if so, who? Yes  Plans to Return to Present Housing: Yes  Other Identified Issues/Barriers to RETURNING to current housing: may need rehab   Potential Assistance needed at discharge: N/A            Potential DME:    Patient expects to discharge to: Acute rehab  Plan for transportation at discharge:

## 2024-03-16 NOTE — PLAN OF CARE
Problem: Discharge Planning  Goal: Discharge to home or other facility with appropriate resources  Outcome: Progressing     Problem: Neurosensory - Adult  Goal: Achieves stable or improved neurological status  Outcome: Progressing     Problem: Neurosensory - Adult  Goal: Absence of seizures  Outcome: Progressing     Problem: Neurosensory - Adult  Goal: Remains free of injury related to seizures activity  Outcome: Progressing     Problem: Neurosensory - Adult  Goal: Achieves maximal functionality and self care  Outcome: Progressing     Problem: Respiratory - Adult  Goal: Achieves optimal ventilation and oxygenation  Outcome: Progressing     Problem: Cardiovascular - Adult  Goal: Maintains optimal cardiac output and hemodynamic stability  Outcome: Progressing     Problem: Cardiovascular - Adult  Goal: Absence of cardiac dysrhythmias or at baseline  Outcome: Progressing     Continue with plan of care.

## 2024-03-16 NOTE — ED NOTES
2020 paged code stroke     2021 Called CT     2021 Called  stroke     2027  stroke team called back to speak with Dr. Anderson

## 2024-03-17 ENCOUNTER — APPOINTMENT (OUTPATIENT)
Dept: MRI IMAGING | Age: 59
DRG: 065 | End: 2024-03-17
Attending: INTERNAL MEDICINE
Payer: MEDICARE

## 2024-03-17 LAB
ANION GAP SERPL CALCULATED.3IONS-SCNC: 12 MMOL/L (ref 3–16)
BUN SERPL-MCNC: 13 MG/DL (ref 7–20)
CALCIUM SERPL-MCNC: 9.3 MG/DL (ref 8.3–10.6)
CHLORIDE SERPL-SCNC: 101 MMOL/L (ref 99–110)
CO2 SERPL-SCNC: 24 MMOL/L (ref 21–32)
CREAT SERPL-MCNC: 1.1 MG/DL (ref 0.9–1.3)
DEPRECATED RDW RBC AUTO: 12.7 % (ref 12.4–15.4)
GFR SERPLBLD CREATININE-BSD FMLA CKD-EPI: >60 ML/MIN/{1.73_M2}
GLUCOSE SERPL-MCNC: 108 MG/DL (ref 70–99)
HCT VFR BLD AUTO: 44.1 % (ref 40.5–52.5)
HGB BLD-MCNC: 15 G/DL (ref 13.5–17.5)
MCH RBC QN AUTO: 29.8 PG (ref 26–34)
MCHC RBC AUTO-ENTMCNC: 34.1 G/DL (ref 31–36)
MCV RBC AUTO: 87.4 FL (ref 80–100)
PLATELET # BLD AUTO: 274 K/UL (ref 135–450)
PMV BLD AUTO: 8.4 FL (ref 5–10.5)
POTASSIUM SERPL-SCNC: 3.9 MMOL/L (ref 3.5–5.1)
RBC # BLD AUTO: 5.05 M/UL (ref 4.2–5.9)
SODIUM SERPL-SCNC: 137 MMOL/L (ref 136–145)
WBC # BLD AUTO: 8.5 K/UL (ref 4–11)

## 2024-03-17 PROCEDURE — 80048 BASIC METABOLIC PNL TOTAL CA: CPT

## 2024-03-17 PROCEDURE — 6360000004 HC RX CONTRAST MEDICATION: Performed by: NURSE PRACTITIONER

## 2024-03-17 PROCEDURE — A9579 GAD-BASE MR CONTRAST NOS,1ML: HCPCS | Performed by: NURSE PRACTITIONER

## 2024-03-17 PROCEDURE — 85027 COMPLETE CBC AUTOMATED: CPT

## 2024-03-17 PROCEDURE — 80061 LIPID PANEL: CPT

## 2024-03-17 PROCEDURE — 6370000000 HC RX 637 (ALT 250 FOR IP): Performed by: NURSE PRACTITIONER

## 2024-03-17 PROCEDURE — 36415 COLL VENOUS BLD VENIPUNCTURE: CPT

## 2024-03-17 PROCEDURE — 1200000000 HC SEMI PRIVATE

## 2024-03-17 PROCEDURE — 70552 MRI BRAIN STEM W/DYE: CPT

## 2024-03-17 PROCEDURE — 70546 MR ANGIOGRAPH HEAD W/O&W/DYE: CPT

## 2024-03-17 PROCEDURE — 2580000003 HC RX 258: Performed by: INTERNAL MEDICINE

## 2024-03-17 PROCEDURE — 6370000000 HC RX 637 (ALT 250 FOR IP): Performed by: INTERNAL MEDICINE

## 2024-03-17 PROCEDURE — 70551 MRI BRAIN STEM W/O DYE: CPT

## 2024-03-17 PROCEDURE — 6370000000 HC RX 637 (ALT 250 FOR IP): Performed by: NEUROMUSCULOSKELETAL MEDICINE & OMM

## 2024-03-17 RX ADMIN — ATORVASTATIN CALCIUM 40 MG: 40 TABLET, FILM COATED ORAL at 20:00

## 2024-03-17 RX ADMIN — Medication 10 ML: at 08:32

## 2024-03-17 RX ADMIN — ASPIRIN 81 MG 81 MG: 81 TABLET ORAL at 08:32

## 2024-03-17 RX ADMIN — DOCUSATE SODIUM 100 MG: 100 CAPSULE, LIQUID FILLED ORAL at 20:00

## 2024-03-17 RX ADMIN — Medication 10 ML: at 20:01

## 2024-03-17 RX ADMIN — GADOTERIDOL 20 ML: 279.3 INJECTION, SOLUTION INTRAVENOUS at 17:17

## 2024-03-17 RX ADMIN — DOCUSATE SODIUM 100 MG: 100 CAPSULE, LIQUID FILLED ORAL at 08:32

## 2024-03-17 RX ADMIN — CLOPIDOGREL BISULFATE 75 MG: 75 TABLET ORAL at 08:32

## 2024-03-17 NOTE — PLAN OF CARE
Problem: Discharge Planning  Goal: Discharge to home or other facility with appropriate resources  Outcome: Progressing  Flowsheets (Taken 3/16/2024 0915 by Chrissie Renee, RN)  Discharge to home or other facility with appropriate resources:   Identify barriers to discharge with patient and caregiver   Arrange for needed discharge resources and transportation as appropriate     Problem: Neurosensory - Adult  Goal: Achieves stable or improved neurological status  3/16/2024 2047 by Rainer Harvey RN  Outcome: Progressing  3/16/2024 1049 by Chrissie Renee RN  Outcome: Progressing  Flowsheets (Taken 3/16/2024 0915)  Achieves stable or improved neurological status:   Assess for and report changes in neurological status   Maintain blood pressure and fluid volume within ordered parameters to optimize cerebral perfusion and minimize risk of hemorrhage  Goal: Absence of seizures  Outcome: Progressing  Goal: Remains free of injury related to seizures activity  Outcome: Progressing  Goal: Achieves maximal functionality and self care  3/16/2024 2047 by Rainer Harvey RN  Outcome: Progressing  3/16/2024 1049 by Chrissie Renee RN  Outcome: Progressing     Problem: Respiratory - Adult  Goal: Achieves optimal ventilation and oxygenation  Outcome: Progressing  Flowsheets (Taken 3/16/2024 0915 by Chrissie Renee, RN)  Achieves optimal ventilation and oxygenation: Assess for changes in mentation and behavior     Problem: Cardiovascular - Adult  Goal: Maintains optimal cardiac output and hemodynamic stability  Outcome: Progressing  Goal: Absence of cardiac dysrhythmias or at baseline  Outcome: Progressing     Problem: Safety - Adult  Goal: Free from fall injury  Outcome: Progressing

## 2024-03-18 ENCOUNTER — APPOINTMENT (OUTPATIENT)
Dept: MRI IMAGING | Age: 59
DRG: 065 | End: 2024-03-18
Attending: INTERNAL MEDICINE
Payer: MEDICARE

## 2024-03-18 LAB
ANION GAP SERPL CALCULATED.3IONS-SCNC: 11 MMOL/L (ref 3–16)
BUN SERPL-MCNC: 13 MG/DL (ref 7–20)
CALCIUM SERPL-MCNC: 9.1 MG/DL (ref 8.3–10.6)
CHLORIDE SERPL-SCNC: 101 MMOL/L (ref 99–110)
CHOLEST SERPL-MCNC: 169 MG/DL (ref 0–199)
CO2 SERPL-SCNC: 24 MMOL/L (ref 21–32)
CREAT SERPL-MCNC: 1.1 MG/DL (ref 0.9–1.3)
D DIMER: <0.27 UG/ML FEU (ref 0–0.6)
GFR SERPLBLD CREATININE-BSD FMLA CKD-EPI: >60 ML/MIN/{1.73_M2}
GLUCOSE SERPL-MCNC: 105 MG/DL (ref 70–99)
HDLC SERPL-MCNC: 35 MG/DL (ref 40–60)
LDLC SERPL CALC-MCNC: 104 MG/DL
POTASSIUM SERPL-SCNC: 4.1 MMOL/L (ref 3.5–5.1)
SODIUM SERPL-SCNC: 136 MMOL/L (ref 136–145)
TRIGL SERPL-MCNC: 151 MG/DL (ref 0–150)
VLDLC SERPL CALC-MCNC: 30 MG/DL

## 2024-03-18 PROCEDURE — 95819 EEG AWAKE AND ASLEEP: CPT

## 2024-03-18 PROCEDURE — 36415 COLL VENOUS BLD VENIPUNCTURE: CPT

## 2024-03-18 PROCEDURE — 99233 SBSQ HOSP IP/OBS HIGH 50: CPT | Performed by: PSYCHIATRY & NEUROLOGY

## 2024-03-18 PROCEDURE — 85379 FIBRIN DEGRADATION QUANT: CPT

## 2024-03-18 PROCEDURE — C8929 TTE W OR WO FOL WCON,DOPPLER: HCPCS

## 2024-03-18 PROCEDURE — 80048 BASIC METABOLIC PNL TOTAL CA: CPT

## 2024-03-18 PROCEDURE — 6370000000 HC RX 637 (ALT 250 FOR IP): Performed by: NEUROMUSCULOSKELETAL MEDICINE & OMM

## 2024-03-18 PROCEDURE — 1200000000 HC SEMI PRIVATE

## 2024-03-18 PROCEDURE — A9579 GAD-BASE MR CONTRAST NOS,1ML: HCPCS | Performed by: PSYCHIATRY & NEUROLOGY

## 2024-03-18 PROCEDURE — 6360000004 HC RX CONTRAST MEDICATION: Performed by: PSYCHIATRY & NEUROLOGY

## 2024-03-18 PROCEDURE — 70546 MR ANGIOGRAPH HEAD W/O&W/DYE: CPT

## 2024-03-18 PROCEDURE — 2580000003 HC RX 258: Performed by: INTERNAL MEDICINE

## 2024-03-18 PROCEDURE — 95816 EEG AWAKE AND DROWSY: CPT | Performed by: PSYCHIATRY & NEUROLOGY

## 2024-03-18 PROCEDURE — 6370000000 HC RX 637 (ALT 250 FOR IP): Performed by: INTERNAL MEDICINE

## 2024-03-18 PROCEDURE — 6370000000 HC RX 637 (ALT 250 FOR IP): Performed by: NURSE PRACTITIONER

## 2024-03-18 RX ADMIN — GADOTERIDOL 20 ML: 279.3 INJECTION, SOLUTION INTRAVENOUS at 18:28

## 2024-03-18 RX ADMIN — DOCUSATE SODIUM 100 MG: 100 CAPSULE, LIQUID FILLED ORAL at 08:13

## 2024-03-18 RX ADMIN — CLOPIDOGREL BISULFATE 75 MG: 75 TABLET ORAL at 08:13

## 2024-03-18 RX ADMIN — ATORVASTATIN CALCIUM 40 MG: 40 TABLET, FILM COATED ORAL at 20:41

## 2024-03-18 RX ADMIN — Medication 10 ML: at 08:13

## 2024-03-18 RX ADMIN — DOCUSATE SODIUM 100 MG: 100 CAPSULE, LIQUID FILLED ORAL at 20:41

## 2024-03-18 RX ADMIN — ASPIRIN 81 MG 81 MG: 81 TABLET ORAL at 08:13

## 2024-03-18 RX ADMIN — Medication 10 ML: at 20:42

## 2024-03-18 ASSESSMENT — PAIN - FUNCTIONAL ASSESSMENT: PAIN_FUNCTIONAL_ASSESSMENT: ACTIVITIES ARE NOT PREVENTED

## 2024-03-18 ASSESSMENT — PAIN DESCRIPTION - DESCRIPTORS: DESCRIPTORS: ACHING

## 2024-03-18 ASSESSMENT — PAIN SCALES - GENERAL
PAINLEVEL_OUTOF10: 5
PAINLEVEL_OUTOF10: 3
PAINLEVEL_OUTOF10: 0

## 2024-03-18 ASSESSMENT — PAIN DESCRIPTION - ONSET: ONSET: OTHER (COMMENT)

## 2024-03-18 ASSESSMENT — PAIN DESCRIPTION - LOCATION
LOCATION: FOOT
LOCATION: HEAD

## 2024-03-18 ASSESSMENT — PAIN DESCRIPTION - ORIENTATION
ORIENTATION: LEFT
ORIENTATION: POSTERIOR

## 2024-03-18 ASSESSMENT — PAIN DESCRIPTION - FREQUENCY: FREQUENCY: INTERMITTENT

## 2024-03-18 ASSESSMENT — PAIN DESCRIPTION - PAIN TYPE
TYPE: ACUTE PAIN
TYPE: ACUTE PAIN;CHRONIC PAIN

## 2024-03-18 NOTE — PLAN OF CARE
Problem: Discharge Planning  Goal: Discharge to home or other facility with appropriate resources  Outcome: Progressing     Problem: Neurosensory - Adult  Goal: Achieves stable or improved neurological status  Outcome: Progressing  Goal: Absence of seizures  Outcome: Progressing  Goal: Remains free of injury related to seizures activity  Outcome: Progressing  Goal: Achieves maximal functionality and self care  Outcome: Progressing     Problem: Respiratory - Adult  Goal: Achieves optimal ventilation and oxygenation  Outcome: Progressing  Flowsheets (Taken 3/17/2024 2030)  Achieves optimal ventilation and oxygenation: Assess for changes in respiratory status     Problem: Cardiovascular - Adult  Goal: Maintains optimal cardiac output and hemodynamic stability  Outcome: Progressing  Goal: Absence of cardiac dysrhythmias or at baseline  Outcome: Progressing     Problem: Safety - Adult  Goal: Free from fall injury  Outcome: Progressing

## 2024-03-18 NOTE — CARE COORDINATION
Chart reviewed day 2. Pt is followed by IM and Neuro. Anticipate ready for d/c today or tomorrow per IM note. Call placed to [facility] r/t starting cert, staff looking into consult and acceptance this am. Awaiting final Neuro recs post MRI results. Will follow for needs as they arise. Electronically signed by AUSTIN MELLO RN on 3/18/2024 at 9:36 AM

## 2024-03-18 NOTE — CARE COORDINATION
[facility] - Acute Rehab Unit   After review, this patient is felt to be:       []  Appropriate for Acute Inpatient Rehab    [x]  Appropriate for Acute Inpatient Rehab Pending Insurance Authorization    []  Not appropriate for Acute Inpatient Rehab    []  Referral received and ARU reviewing patient; Evaluation ongoing.      Precert initiated 3/18 for ARU admission with [employer].     Will notify DCP with further updates. Thank you for the referral.    Thank you.   Patrizia Carvajal M.A, CCC-SLP  Clinical Liaison

## 2024-03-18 NOTE — CARE COORDINATION
[facility]hab [facility]   After review, this patient is felt to be:       []  Appropriate for Acute Inpatient Rehab    []  Appropriate for Acute Inpatient Rehab Pending Insurance Authorization    []  Not appropriate for Acute Inpatient Rehab    [x]  Referral received and ARU reviewing patient; Evaluation ongoing.      Precert required for ARU admission. Awaiting Neuro recs  post MRI. Ref#: pending.  Will notify DCP with further updates. Thank you for the referral.;

## 2024-03-18 NOTE — PROCEDURES
INTERPRETATION:  This 25-minute, computer-assisted video EEG recording is normal.  No potentially epileptiform activity was present during the recording.      CLASSIFICATION:  Normal (awake and drowsy).  Sleep - unsuccessful.  EKG channel.    DESCRIPTION:    BACKGROUND:  The awake recording revealed 9 Hz alpha activity over the posterior head region.  Given the extensive study, the patient still did not sleep.  The EEG showed normal V waves during drowsiness.  There was no significant change on the EEG background with photic stimulation.  Hyperventilation was omitted due to abnormal MRI brain.    INTERICTAL DISCHARGES: None    CLINICAL EVENTS:  None    The EKG channel was unremarkable.

## 2024-03-19 PROCEDURE — 97530 THERAPEUTIC ACTIVITIES: CPT

## 2024-03-19 PROCEDURE — 6370000000 HC RX 637 (ALT 250 FOR IP): Performed by: NURSE PRACTITIONER

## 2024-03-19 PROCEDURE — 92526 ORAL FUNCTION THERAPY: CPT

## 2024-03-19 PROCEDURE — 97116 GAIT TRAINING THERAPY: CPT

## 2024-03-19 PROCEDURE — 6370000000 HC RX 637 (ALT 250 FOR IP): Performed by: INTERNAL MEDICINE

## 2024-03-19 PROCEDURE — 1200000000 HC SEMI PRIVATE

## 2024-03-19 PROCEDURE — 97535 SELF CARE MNGMENT TRAINING: CPT

## 2024-03-19 PROCEDURE — 99233 SBSQ HOSP IP/OBS HIGH 50: CPT | Performed by: PSYCHIATRY & NEUROLOGY

## 2024-03-19 PROCEDURE — 6370000000 HC RX 637 (ALT 250 FOR IP): Performed by: NEUROMUSCULOSKELETAL MEDICINE & OMM

## 2024-03-19 PROCEDURE — 6360000002 HC RX W HCPCS

## 2024-03-19 PROCEDURE — 97110 THERAPEUTIC EXERCISES: CPT

## 2024-03-19 PROCEDURE — 2580000003 HC RX 258: Performed by: INTERNAL MEDICINE

## 2024-03-19 RX ORDER — ENOXAPARIN SODIUM 100 MG/ML
30 INJECTION SUBCUTANEOUS 2 TIMES DAILY
Status: DISCONTINUED | OUTPATIENT
Start: 2024-03-19 | End: 2024-03-26 | Stop reason: HOSPADM

## 2024-03-19 RX ORDER — OXYCODONE HYDROCHLORIDE AND ACETAMINOPHEN 5; 325 MG/1; MG/1
1 TABLET ORAL EVERY 4 HOURS PRN
Status: DISCONTINUED | OUTPATIENT
Start: 2024-03-19 | End: 2024-03-26 | Stop reason: HOSPADM

## 2024-03-19 RX ADMIN — OXYCODONE AND ACETAMINOPHEN 1 TABLET: 5; 325 TABLET ORAL at 12:11

## 2024-03-19 RX ADMIN — ATORVASTATIN CALCIUM 40 MG: 40 TABLET, FILM COATED ORAL at 20:59

## 2024-03-19 RX ADMIN — CLOPIDOGREL BISULFATE 75 MG: 75 TABLET ORAL at 08:45

## 2024-03-19 RX ADMIN — Medication 10 ML: at 08:46

## 2024-03-19 RX ADMIN — Medication 10 ML: at 21:00

## 2024-03-19 RX ADMIN — OXYCODONE AND ACETAMINOPHEN 1 TABLET: 5; 325 TABLET ORAL at 22:59

## 2024-03-19 RX ADMIN — DOCUSATE SODIUM 100 MG: 100 CAPSULE, LIQUID FILLED ORAL at 20:59

## 2024-03-19 RX ADMIN — DOCUSATE SODIUM 100 MG: 100 CAPSULE, LIQUID FILLED ORAL at 08:45

## 2024-03-19 RX ADMIN — ASPIRIN 81 MG 81 MG: 81 TABLET ORAL at 08:45

## 2024-03-19 RX ADMIN — ENOXAPARIN SODIUM 30 MG: 100 INJECTION SUBCUTANEOUS at 20:59

## 2024-03-19 RX ADMIN — OXYCODONE AND ACETAMINOPHEN 1 TABLET: 5; 325 TABLET ORAL at 02:44

## 2024-03-19 ASSESSMENT — PAIN DESCRIPTION - ORIENTATION
ORIENTATION: LEFT

## 2024-03-19 ASSESSMENT — PAIN DESCRIPTION - LOCATION
LOCATION: FOOT

## 2024-03-19 ASSESSMENT — PAIN DESCRIPTION - ONSET: ONSET: GRADUAL

## 2024-03-19 ASSESSMENT — PAIN - FUNCTIONAL ASSESSMENT: PAIN_FUNCTIONAL_ASSESSMENT: ACTIVITIES ARE NOT PREVENTED

## 2024-03-19 ASSESSMENT — PAIN SCALES - GENERAL
PAINLEVEL_OUTOF10: 7
PAINLEVEL_OUTOF10: 8
PAINLEVEL_OUTOF10: 4
PAINLEVEL_OUTOF10: 4

## 2024-03-19 ASSESSMENT — PAIN DESCRIPTION - DESCRIPTORS: DESCRIPTORS: ACHING

## 2024-03-19 ASSESSMENT — PAIN DESCRIPTION - FREQUENCY: FREQUENCY: INTERMITTENT

## 2024-03-19 ASSESSMENT — PAIN DESCRIPTION - PAIN TYPE: TYPE: ACUTE PAIN;CHRONIC PAIN

## 2024-03-19 NOTE — CARE COORDINATION
Chart reviewed day 3. Pt is followed by IM and Neuro. MRV and EEG completed yesterday, awaiting Neuro recs. Cert pending to ARU, placed on 3/18/24. IPTA. Will follow for needs as they arise. Electronically signed by AUSTIN MELLO RN on 3/19/2024 at 9:20 AM

## 2024-03-19 NOTE — PLAN OF CARE
Problem: Discharge Planning  Goal: Discharge to home or other facility with appropriate resources  Outcome: Progressing     Problem: Neurosensory - Adult  Goal: Achieves stable or improved neurological status  Outcome: Progressing     Problem: Safety - Adult  Goal: Free from fall injury  Outcome: Progressing     Problem: Pain  Goal: Verbalizes/displays adequate comfort level or baseline comfort level  Outcome: Progressing     Problem: Musculoskeletal - Adult  Goal: Return mobility to safest level of function  Outcome: Progressing  Goal: Return ADL status to a safe level of function  Outcome: Progressing

## 2024-03-19 NOTE — CARE COORDINATION
Writer placed call to Ailyn at [facility] r/t Cert for ARU, VM left. Electronically signed by AUSTIN MELLO RN on 3/19/2024 at 3:25 PM

## 2024-03-20 PROCEDURE — 6370000000 HC RX 637 (ALT 250 FOR IP): Performed by: NURSE PRACTITIONER

## 2024-03-20 PROCEDURE — 6370000000 HC RX 637 (ALT 250 FOR IP): Performed by: INTERNAL MEDICINE

## 2024-03-20 PROCEDURE — 6360000002 HC RX W HCPCS

## 2024-03-20 PROCEDURE — 99233 SBSQ HOSP IP/OBS HIGH 50: CPT | Performed by: PSYCHIATRY & NEUROLOGY

## 2024-03-20 PROCEDURE — 1200000000 HC SEMI PRIVATE

## 2024-03-20 PROCEDURE — 2580000003 HC RX 258: Performed by: INTERNAL MEDICINE

## 2024-03-20 PROCEDURE — 6370000000 HC RX 637 (ALT 250 FOR IP): Performed by: NEUROMUSCULOSKELETAL MEDICINE & OMM

## 2024-03-20 RX ADMIN — ASPIRIN 81 MG 81 MG: 81 TABLET ORAL at 09:12

## 2024-03-20 RX ADMIN — ENOXAPARIN SODIUM 30 MG: 100 INJECTION SUBCUTANEOUS at 20:58

## 2024-03-20 RX ADMIN — ENOXAPARIN SODIUM 30 MG: 100 INJECTION SUBCUTANEOUS at 09:12

## 2024-03-20 RX ADMIN — CLOPIDOGREL BISULFATE 75 MG: 75 TABLET ORAL at 09:12

## 2024-03-20 RX ADMIN — OXYCODONE AND ACETAMINOPHEN 1 TABLET: 5; 325 TABLET ORAL at 21:18

## 2024-03-20 RX ADMIN — ATORVASTATIN CALCIUM 40 MG: 40 TABLET, FILM COATED ORAL at 20:58

## 2024-03-20 RX ADMIN — DOCUSATE SODIUM 100 MG: 100 CAPSULE, LIQUID FILLED ORAL at 20:57

## 2024-03-20 RX ADMIN — Medication 10 ML: at 21:03

## 2024-03-20 ASSESSMENT — PAIN DESCRIPTION - DESCRIPTORS: DESCRIPTORS: ACHING

## 2024-03-20 ASSESSMENT — PAIN SCALES - GENERAL
PAINLEVEL_OUTOF10: 3
PAINLEVEL_OUTOF10: 7

## 2024-03-20 ASSESSMENT — PAIN DESCRIPTION - LOCATION: LOCATION: FOOT

## 2024-03-20 ASSESSMENT — PAIN DESCRIPTION - ORIENTATION: ORIENTATION: LEFT

## 2024-03-20 NOTE — PLAN OF CARE
TODAY'S DATE:  3/20/2024      Current NIHSS 2      Discussed personal risk factors for Stroke/TIA with patient/family, and ways to reduce the risk for a recurrent stroke.     Patient's personal risk factors which were identified are:   []   Alcohol Abuse: check with your physician before any alcohol consumption.   [x]   Atrial fibrillation: may cause blood clots  []   Drug Abuse: Seek help, talk with your doctor  []   Clotting Disorder  []   Diabetes  []   Family history of stroke or heart disease  [x]   High Blood Pressure/Hypertension: work with your physician  []   High cholesterol: monitor cholesterol levels with your physician  []   Overweight/Obesity: work with your physician for your ideal body weight  []   Physical Inactivity: get regular exercise as directed by your physician  []   Personal history of previous TIA or stroke  []   Poor Diet: decrease salt (sodium) in your diet, follow diet directed by physician  []   Smoking: stop smoking      Reviewed the Following Education with Patient and/or Family:   - Signs and Symptoms of Stroke  - Personal risk factors   - How to activate EMS (911)   - Importance of Follow Up Appointments at Discharge   - Importance of Compliance with Medications Prescribed at Discharge  - Available community resources and stroke advocacy groups if needed    Patient and/or family member: verbalized understanding.     Stroke Education booklet given to patient/family (or verified, if given already), which reviews above information. yes         Electronically signed by Nelly Bradshaw RN on 3/20/2024 at 11:21 AM

## 2024-03-20 NOTE — CARE COORDINATION
Chart reviewed day 4. Pt is followed by IM. Avoidable day #1. Cert for ARU pending, resubmitted today. Will follow for needs as they arise. Electronically signed by AUSTIN MELLO RN on 3/20/2024 at 11:42 AM

## 2024-03-20 NOTE — PLAN OF CARE
Pt encouraged to call out when in need. Call light and bedside table within reach . Bed rails up 3/4 wheels locked with bed in lowest position. Non skids on with bed alarm engaged. Pt verbalizes understanding , will continue To monitor.

## 2024-03-20 NOTE — PLAN OF CARE
Problem: Discharge Planning  Goal: Discharge to home or other facility with appropriate resources  Outcome: Progressing     Problem: Neurosensory - Adult  Goal: Achieves stable or improved neurological status  Outcome: Progressing     Problem: Safety - Adult  Goal: Free from fall injury  Outcome: Progressing     Problem: Pain  Goal: Verbalizes/displays adequate comfort level or baseline comfort level  Outcome: Progressing     Problem: Musculoskeletal - Adult  Goal: Return mobility to safest level of function  Outcome: Progressing

## 2024-03-20 NOTE — CARE COORDINATION
Checked on precert and it is still pending. Alix Olivares RN  [id number] Sent updated clinicals to insurance. Alix Olivares RN

## 2024-03-21 PROCEDURE — 6370000000 HC RX 637 (ALT 250 FOR IP): Performed by: NEUROMUSCULOSKELETAL MEDICINE & OMM

## 2024-03-21 PROCEDURE — 2580000003 HC RX 258: Performed by: INTERNAL MEDICINE

## 2024-03-21 PROCEDURE — 1200000000 HC SEMI PRIVATE

## 2024-03-21 PROCEDURE — 6370000000 HC RX 637 (ALT 250 FOR IP): Performed by: INTERNAL MEDICINE

## 2024-03-21 PROCEDURE — 6360000002 HC RX W HCPCS

## 2024-03-21 PROCEDURE — 6370000000 HC RX 637 (ALT 250 FOR IP): Performed by: NURSE PRACTITIONER

## 2024-03-21 RX ORDER — ONDANSETRON 4 MG/1
4 TABLET, ORALLY DISINTEGRATING ORAL EVERY 8 HOURS PRN
OUTPATIENT
Start: 2024-03-21

## 2024-03-21 RX ORDER — OXYCODONE HYDROCHLORIDE AND ACETAMINOPHEN 5; 325 MG/1; MG/1
1 TABLET ORAL EVERY 4 HOURS PRN
OUTPATIENT
Start: 2024-03-21

## 2024-03-21 RX ORDER — PSEUDOEPHEDRINE HCL 30 MG
100 TABLET ORAL 2 TIMES DAILY
Qty: 14 CAPSULE | Refills: 0 | Status: SHIPPED | OUTPATIENT
Start: 2024-03-21

## 2024-03-21 RX ORDER — ASPIRIN 81 MG/1
81 TABLET, CHEWABLE ORAL DAILY
Qty: 30 TABLET | Refills: 3 | Status: SHIPPED | OUTPATIENT
Start: 2024-03-22

## 2024-03-21 RX ORDER — ATORVASTATIN CALCIUM 40 MG/1
40 TABLET, FILM COATED ORAL NIGHTLY
OUTPATIENT
Start: 2024-03-21

## 2024-03-21 RX ORDER — DOCUSATE SODIUM 100 MG/1
100 CAPSULE, LIQUID FILLED ORAL 2 TIMES DAILY
OUTPATIENT
Start: 2024-03-21

## 2024-03-21 RX ORDER — ENOXAPARIN SODIUM 100 MG/ML
30 INJECTION SUBCUTANEOUS 2 TIMES DAILY
OUTPATIENT
Start: 2024-03-21

## 2024-03-21 RX ORDER — BISACODYL 10 MG
10 SUPPOSITORY, RECTAL RECTAL DAILY PRN
OUTPATIENT
Start: 2024-03-21

## 2024-03-21 RX ORDER — CLOPIDOGREL BISULFATE 75 MG/1
75 TABLET ORAL DAILY
Qty: 30 TABLET | Refills: 0 | Status: SHIPPED | OUTPATIENT
Start: 2024-03-22

## 2024-03-21 RX ORDER — ATORVASTATIN CALCIUM 40 MG/1
40 TABLET, FILM COATED ORAL NIGHTLY
Qty: 30 TABLET | Refills: 3 | Status: SHIPPED | OUTPATIENT
Start: 2024-03-21

## 2024-03-21 RX ORDER — ASPIRIN 81 MG/1
81 TABLET, CHEWABLE ORAL DAILY
OUTPATIENT
Start: 2024-03-22

## 2024-03-21 RX ORDER — CLOPIDOGREL BISULFATE 75 MG/1
75 TABLET ORAL DAILY
OUTPATIENT
Start: 2024-03-22

## 2024-03-21 RX ORDER — POLYETHYLENE GLYCOL 3350 17 G/17G
17 POWDER, FOR SOLUTION ORAL DAILY PRN
OUTPATIENT
Start: 2024-03-21

## 2024-03-21 RX ADMIN — Medication 10 ML: at 09:18

## 2024-03-21 RX ADMIN — ATORVASTATIN CALCIUM 40 MG: 40 TABLET, FILM COATED ORAL at 21:37

## 2024-03-21 RX ADMIN — ENOXAPARIN SODIUM 30 MG: 100 INJECTION SUBCUTANEOUS at 09:10

## 2024-03-21 RX ADMIN — Medication 10 ML: at 21:42

## 2024-03-21 RX ADMIN — ENOXAPARIN SODIUM 30 MG: 100 INJECTION SUBCUTANEOUS at 21:38

## 2024-03-21 RX ADMIN — ASPIRIN 81 MG 81 MG: 81 TABLET ORAL at 09:10

## 2024-03-21 RX ADMIN — DOCUSATE SODIUM 100 MG: 100 CAPSULE, LIQUID FILLED ORAL at 21:37

## 2024-03-21 RX ADMIN — CLOPIDOGREL BISULFATE 75 MG: 75 TABLET ORAL at 09:10

## 2024-03-21 NOTE — CARE COORDINATION
Called to [employer] to check on precert and was informed they have all the clinical information and case has been expedited. Alix Olivares RN

## 2024-03-21 NOTE — PLAN OF CARE
Problem: Discharge Planning  Goal: Discharge to home or other facility with appropriate resources  Outcome: Progressing     Problem: Neurosensory - Adult  Goal: Achieves stable or improved neurological status  Outcome: Progressing  Goal: Absence of seizures  Outcome: Progressing  Goal: Achieves maximal functionality and self care  Outcome: Progressing     Problem: Respiratory - Adult  Goal: Achieves optimal ventilation and oxygenation  Outcome: Progressing     Problem: Cardiovascular - Adult  Goal: Maintains optimal cardiac output and hemodynamic stability  Outcome: Progressing  Goal: Absence of cardiac dysrhythmias or at baseline  Outcome: Progressing     Problem: Safety - Adult  Goal: Free from fall injury  Outcome: Progressing     Problem: Pain  Goal: Verbalizes/displays adequate comfort level or baseline comfort level  Outcome: Progressing     Problem: Musculoskeletal - Adult  Goal: Return mobility to safest level of function  Outcome: Progressing  Goal: Return ADL status to a safe level of function  Outcome: Progressing

## 2024-03-21 NOTE — CARE COORDINATION
Avoidable day #2. Chart reviewed day 5. Pt is followed by IM. D/c order noted. Cert placed on 3/20 is pending to ARU. Currently corresponding w/Sean d/t cert issues on insurance end. Will follow for needs as they arise. Electronically signed by AUSTIN MELLO RN on 3/21/2024 at 10:48 AM

## 2024-03-22 PROCEDURE — 6370000000 HC RX 637 (ALT 250 FOR IP): Performed by: NURSE PRACTITIONER

## 2024-03-22 PROCEDURE — 6370000000 HC RX 637 (ALT 250 FOR IP): Performed by: INTERNAL MEDICINE

## 2024-03-22 PROCEDURE — 97535 SELF CARE MNGMENT TRAINING: CPT

## 2024-03-22 PROCEDURE — 2580000003 HC RX 258: Performed by: INTERNAL MEDICINE

## 2024-03-22 PROCEDURE — 97116 GAIT TRAINING THERAPY: CPT

## 2024-03-22 PROCEDURE — 97110 THERAPEUTIC EXERCISES: CPT

## 2024-03-22 PROCEDURE — 97530 THERAPEUTIC ACTIVITIES: CPT

## 2024-03-22 PROCEDURE — 1200000000 HC SEMI PRIVATE

## 2024-03-22 PROCEDURE — 6360000002 HC RX W HCPCS

## 2024-03-22 PROCEDURE — 6370000000 HC RX 637 (ALT 250 FOR IP)

## 2024-03-22 PROCEDURE — 6370000000 HC RX 637 (ALT 250 FOR IP): Performed by: NEUROMUSCULOSKELETAL MEDICINE & OMM

## 2024-03-22 RX ORDER — GABAPENTIN 100 MG/1
100 CAPSULE ORAL 2 TIMES DAILY
Status: DISCONTINUED | OUTPATIENT
Start: 2024-03-22 | End: 2024-03-24

## 2024-03-22 RX ADMIN — Medication 10 ML: at 10:29

## 2024-03-22 RX ADMIN — OXYCODONE AND ACETAMINOPHEN 1 TABLET: 5; 325 TABLET ORAL at 03:27

## 2024-03-22 RX ADMIN — CLOPIDOGREL BISULFATE 75 MG: 75 TABLET ORAL at 10:30

## 2024-03-22 RX ADMIN — DOCUSATE SODIUM 100 MG: 100 CAPSULE, LIQUID FILLED ORAL at 20:41

## 2024-03-22 RX ADMIN — GABAPENTIN 100 MG: 100 CAPSULE ORAL at 20:41

## 2024-03-22 RX ADMIN — ATORVASTATIN CALCIUM 40 MG: 40 TABLET, FILM COATED ORAL at 20:41

## 2024-03-22 RX ADMIN — ASPIRIN 81 MG 81 MG: 81 TABLET ORAL at 10:29

## 2024-03-22 RX ADMIN — ENOXAPARIN SODIUM 30 MG: 100 INJECTION SUBCUTANEOUS at 10:28

## 2024-03-22 RX ADMIN — OXYCODONE AND ACETAMINOPHEN 1 TABLET: 5; 325 TABLET ORAL at 10:36

## 2024-03-22 RX ADMIN — Medication 10 ML: at 20:42

## 2024-03-22 RX ADMIN — DOCUSATE SODIUM 100 MG: 100 CAPSULE, LIQUID FILLED ORAL at 10:29

## 2024-03-22 ASSESSMENT — PAIN DESCRIPTION - LOCATION
LOCATION: FOOT

## 2024-03-22 ASSESSMENT — PAIN DESCRIPTION - DESCRIPTORS
DESCRIPTORS: ACHING

## 2024-03-22 ASSESSMENT — PAIN SCALES - GENERAL
PAINLEVEL_OUTOF10: 2
PAINLEVEL_OUTOF10: 5
PAINLEVEL_OUTOF10: 8
PAINLEVEL_OUTOF10: 3

## 2024-03-22 ASSESSMENT — PAIN - FUNCTIONAL ASSESSMENT
PAIN_FUNCTIONAL_ASSESSMENT: ACTIVITIES ARE NOT PREVENTED
PAIN_FUNCTIONAL_ASSESSMENT: ACTIVITIES ARE NOT PREVENTED

## 2024-03-22 ASSESSMENT — PAIN DESCRIPTION - FREQUENCY: FREQUENCY: INTERMITTENT

## 2024-03-22 ASSESSMENT — PAIN DESCRIPTION - ORIENTATION
ORIENTATION: RIGHT;LEFT
ORIENTATION: LEFT;RIGHT

## 2024-03-22 ASSESSMENT — PAIN DESCRIPTION - ONSET: ONSET: GRADUAL

## 2024-03-22 NOTE — CARE COORDINATION
Avoidable day #3. Chart reviewed day 6. Pt is followed by IM. Cert for ARU still pending, was placed on 3/20. Will follow for needs as they arise. Electronically signed by AUSTIN MELLO RN on 3/22/2024 at 9:18 AM

## 2024-03-22 NOTE — CARE COORDINATION
Cert and P2P denied for ARU. Referral pending to [facility]. Will need cert placed asap. Will be avoidable day #4 tomorrow. Electronically signed by AUSTIN MELLO RN on 3/22/2024 at 4:22 PM

## 2024-03-22 NOTE — CARE COORDINATION
[facility] accepted pt and started cert today. Electronically signed by AUSTIN MELLO RN on 3/22/2024 at 4:39 PM

## 2024-03-22 NOTE — CARE COORDINATION
Another call to [facility] call reference [phone], stated we would get a call  back from the nurse reviewer and the supervisor. Alix Olivares RN

## 2024-03-22 NOTE — PLAN OF CARE
Problem: Discharge Planning  Goal: Discharge to home or other facility with appropriate resources  Outcome: Progressing     Problem: Neurosensory - Adult  Goal: Achieves stable or improved neurological status  Outcome: Progressing  Goal: Absence of seizures  Outcome: Progressing  Goal: Remains free of injury related to seizures activity  Outcome: Progressing  Goal: Achieves maximal functionality and self care  Outcome: Progressing     Problem: Respiratory - Adult  Goal: Achieves optimal ventilation and oxygenation  Outcome: Progressing     Problem: Cardiovascular - Adult  Goal: Maintains optimal cardiac output and hemodynamic stability  Outcome: Progressing  Goal: Absence of cardiac dysrhythmias or at baseline  Outcome: Progressing     Problem: Safety - Adult  Goal: Free from fall injury  Outcome: Progressing     Problem: Pain  Goal: Verbalizes/displays adequate comfort level or baseline comfort level  Outcome: Progressing     Problem: Musculoskeletal - Adult  Goal: Return mobility to safest level of function  Outcome: Progressing  Goal: Return ADL status to a safe level of function  Outcome: Progressing

## 2024-03-22 NOTE — CARE COORDINATION
Call to [employer] and spoke to supervisor about this expedited case. He said we would have decision today. Will update CM when decision received. Alix Olivares RN

## 2024-03-22 NOTE — CARE COORDINATION
RN from insurance called back with intent to deny and offered P2P which Dr. Pollard did and they denied his need for ARU due to lack of medical necessity. Updated , Akila.

## 2024-03-22 NOTE — PLAN OF CARE
Problem: Discharge Planning  Goal: Discharge to home or other facility with appropriate resources  3/22/2024 1111 by PHILIPP GIORDAON  Outcome: Progressing  3/21/2024 2259 by Chester Fitzpatrick RN  Outcome: Progressing     Problem: Neurosensory - Adult  Goal: Achieves stable or improved neurological status  3/22/2024 1111 by PHILIPP GIORDANO  Outcome: Progressing  3/21/2024 2259 by Chester Fitzpatrick RN  Outcome: Progressing  Goal: Absence of seizures  3/22/2024 1111 by PHILIPP GIORDANO  Outcome: Progressing  3/21/2024 2259 by Chester Fitzpatrick RN  Outcome: Progressing  Goal: Remains free of injury related to seizures activity  3/22/2024 1111 by PHILIPP GIORDANO  Outcome: Progressing  3/21/2024 2259 by Chester Fitzpatrick RN  Outcome: Progressing  Goal: Achieves maximal functionality and self care  3/22/2024 1111 by PHILIPP GIORDANO  Outcome: Progressing  3/21/2024 2259 by Chester Fitzpatrick RN  Outcome: Progressing     Problem: Respiratory - Adult  Goal: Achieves optimal ventilation and oxygenation  3/22/2024 1111 by PHILIPP GIORDANO  Outcome: Progressing  3/21/2024 2259 by Chester Fitzpatrick RN  Outcome: Progressing     Problem: Cardiovascular - Adult  Goal: Maintains optimal cardiac output and hemodynamic stability  3/22/2024 1111 by PHILIPP GIORDANO  Outcome: Progressing  3/21/2024 2259 by Chester Fitzpatrick RN  Outcome: Progressing  Goal: Absence of cardiac dysrhythmias or at baseline  3/22/2024 1111 by PHILIPP GIORDANO  Outcome: Progressing  3/21/2024 2259 by Chester Fitzpatrick RN  Outcome: Progressing     Problem: Safety - Adult  Goal: Free from fall injury  3/22/2024 1111 by PHILIPP GIORDANO  Outcome: Progressing  3/21/2024 2259 by Chester Fitzpatrick RN  Outcome: Progressing     Problem: Pain  Goal: Verbalizes/displays adequate comfort level or baseline comfort level  3/22/2024 1111 by PHILIPP GIORDANO  Outcome: Progressing  3/21/2024 2259 by Chester Fitzpatrick RN  Outcome: Progressing     Problem: Musculoskeletal -

## 2024-03-23 PROCEDURE — 1200000000 HC SEMI PRIVATE

## 2024-03-23 PROCEDURE — 6360000002 HC RX W HCPCS

## 2024-03-23 PROCEDURE — 6370000000 HC RX 637 (ALT 250 FOR IP): Performed by: NURSE PRACTITIONER

## 2024-03-23 PROCEDURE — 6370000000 HC RX 637 (ALT 250 FOR IP)

## 2024-03-23 PROCEDURE — 6370000000 HC RX 637 (ALT 250 FOR IP): Performed by: INTERNAL MEDICINE

## 2024-03-23 PROCEDURE — 2580000003 HC RX 258: Performed by: INTERNAL MEDICINE

## 2024-03-23 PROCEDURE — 97535 SELF CARE MNGMENT TRAINING: CPT

## 2024-03-23 PROCEDURE — 6370000000 HC RX 637 (ALT 250 FOR IP): Performed by: NEUROMUSCULOSKELETAL MEDICINE & OMM

## 2024-03-23 PROCEDURE — 97110 THERAPEUTIC EXERCISES: CPT

## 2024-03-23 PROCEDURE — 97530 THERAPEUTIC ACTIVITIES: CPT

## 2024-03-23 RX ADMIN — DOCUSATE SODIUM 100 MG: 100 CAPSULE, LIQUID FILLED ORAL at 20:25

## 2024-03-23 RX ADMIN — DOCUSATE SODIUM 100 MG: 100 CAPSULE, LIQUID FILLED ORAL at 08:10

## 2024-03-23 RX ADMIN — Medication 10 ML: at 20:25

## 2024-03-23 RX ADMIN — ENOXAPARIN SODIUM 30 MG: 100 INJECTION SUBCUTANEOUS at 20:24

## 2024-03-23 RX ADMIN — CLOPIDOGREL BISULFATE 75 MG: 75 TABLET ORAL at 08:10

## 2024-03-23 RX ADMIN — ASPIRIN 81 MG 81 MG: 81 TABLET ORAL at 08:10

## 2024-03-23 RX ADMIN — Medication 10 ML: at 08:11

## 2024-03-23 RX ADMIN — OXYCODONE AND ACETAMINOPHEN 1 TABLET: 5; 325 TABLET ORAL at 20:30

## 2024-03-23 RX ADMIN — ATORVASTATIN CALCIUM 40 MG: 40 TABLET, FILM COATED ORAL at 20:25

## 2024-03-23 RX ADMIN — ENOXAPARIN SODIUM 30 MG: 100 INJECTION SUBCUTANEOUS at 08:10

## 2024-03-23 RX ADMIN — GABAPENTIN 100 MG: 100 CAPSULE ORAL at 08:10

## 2024-03-23 ASSESSMENT — PAIN DESCRIPTION - ORIENTATION
ORIENTATION: RIGHT;LEFT
ORIENTATION: RIGHT;LEFT

## 2024-03-23 ASSESSMENT — PAIN SCALES - GENERAL
PAINLEVEL_OUTOF10: 6
PAINLEVEL_OUTOF10: 6
PAINLEVEL_OUTOF10: 3

## 2024-03-23 ASSESSMENT — PAIN DESCRIPTION - LOCATION
LOCATION: FOOT
LOCATION: FOOT

## 2024-03-23 ASSESSMENT — PAIN DESCRIPTION - DESCRIPTORS
DESCRIPTORS: ACHING
DESCRIPTORS: ACHING

## 2024-03-23 NOTE — PLAN OF CARE
Problem: Discharge Planning  Goal: Discharge to home or other facility with appropriate resources  3/22/2024 2122 by Mitali Pantoja RN  Outcome: Progressing  3/22/2024 1111 by PHILIPP GIORDANO  Outcome: Progressing     Problem: Neurosensory - Adult  Goal: Achieves stable or improved neurological status  3/22/2024 2122 by Mitali Pantoja RN  Outcome: Progressing  3/22/2024 1111 by PHILIPP GIORDANO  Outcome: Progressing  Goal: Absence of seizures  3/22/2024 2122 by Mitali Pantoja RN  Outcome: Progressing  3/22/2024 1111 by PHILIPP GIORDANO  Outcome: Progressing  Goal: Remains free of injury related to seizures activity  3/22/2024 2122 by Mitali Pantoja RN  Outcome: Progressing  3/22/2024 1111 by PHILIPP GIORDANO  Outcome: Progressing  Goal: Achieves maximal functionality and self care  3/22/2024 2122 by Mitali Pantoja RN  Outcome: Progressing  3/22/2024 1111 by PHILIPP GIORDANO  Outcome: Progressing     Problem: Respiratory - Adult  Goal: Achieves optimal ventilation and oxygenation  3/22/2024 2122 by Mitali Pantoja RN  Outcome: Progressing  3/22/2024 1111 by PHILIPP GIORDANO  Outcome: Progressing     Problem: Cardiovascular - Adult  Goal: Maintains optimal cardiac output and hemodynamic stability  3/22/2024 2122 by Mitali Pantoja RN  Outcome: Progressing  3/22/2024 1111 by PHILIPP GIORDANO  Outcome: Progressing  Goal: Absence of cardiac dysrhythmias or at baseline  3/22/2024 2122 by Mitali Pantoja RN  Outcome: Progressing  3/22/2024 1111 by PHILIPP GIORDANO  Outcome: Progressing     Problem: Safety - Adult  Goal: Free from fall injury  3/22/2024 2122 by Mitali Pantoja RN  Outcome: Progressing  3/22/2024 1111 by PHILIPP GIORDANO  Outcome: Progressing     Problem: Pain  Goal: Verbalizes/displays adequate comfort level or baseline comfort level  3/22/2024 2122 by Mitali Pantoja RN  Outcome: Progressing  3/22/2024 1111 by PHILIPP GIORDANO  Outcome:

## 2024-03-23 NOTE — CARE COORDINATION
Viktoria NP updated writer, pt ready for discharge to SNF since IPR was denied.  Writer spoke with admissions for [facility], precert this morning is still pending.  BRITTANY Jones-RN

## 2024-03-24 PROCEDURE — 6370000000 HC RX 637 (ALT 250 FOR IP): Performed by: NEUROMUSCULOSKELETAL MEDICINE & OMM

## 2024-03-24 PROCEDURE — 6370000000 HC RX 637 (ALT 250 FOR IP): Performed by: INTERNAL MEDICINE

## 2024-03-24 PROCEDURE — 6360000002 HC RX W HCPCS

## 2024-03-24 PROCEDURE — 6370000000 HC RX 637 (ALT 250 FOR IP)

## 2024-03-24 PROCEDURE — 2580000003 HC RX 258: Performed by: INTERNAL MEDICINE

## 2024-03-24 PROCEDURE — 6370000000 HC RX 637 (ALT 250 FOR IP): Performed by: NURSE PRACTITIONER

## 2024-03-24 PROCEDURE — 1200000000 HC SEMI PRIVATE

## 2024-03-24 RX ORDER — GABAPENTIN 100 MG/1
200 CAPSULE ORAL 2 TIMES DAILY
Status: DISCONTINUED | OUTPATIENT
Start: 2024-03-24 | End: 2024-03-26 | Stop reason: HOSPADM

## 2024-03-24 RX ADMIN — Medication 10 ML: at 19:25

## 2024-03-24 RX ADMIN — ASPIRIN 81 MG 81 MG: 81 TABLET ORAL at 09:28

## 2024-03-24 RX ADMIN — ENOXAPARIN SODIUM 30 MG: 100 INJECTION SUBCUTANEOUS at 09:28

## 2024-03-24 RX ADMIN — DOCUSATE SODIUM 100 MG: 100 CAPSULE, LIQUID FILLED ORAL at 09:28

## 2024-03-24 RX ADMIN — ENOXAPARIN SODIUM 30 MG: 100 INJECTION SUBCUTANEOUS at 19:24

## 2024-03-24 RX ADMIN — Medication 10 ML: at 09:28

## 2024-03-24 RX ADMIN — ATORVASTATIN CALCIUM 40 MG: 40 TABLET, FILM COATED ORAL at 19:24

## 2024-03-24 RX ADMIN — CLOPIDOGREL BISULFATE 75 MG: 75 TABLET ORAL at 09:28

## 2024-03-24 RX ADMIN — GABAPENTIN 100 MG: 100 CAPSULE ORAL at 09:28

## 2024-03-24 RX ADMIN — DOCUSATE SODIUM 100 MG: 100 CAPSULE, LIQUID FILLED ORAL at 19:24

## 2024-03-24 RX ADMIN — GABAPENTIN 200 MG: 100 CAPSULE ORAL at 19:24

## 2024-03-24 ASSESSMENT — PAIN DESCRIPTION - ORIENTATION
ORIENTATION: LEFT
ORIENTATION: RIGHT;LEFT
ORIENTATION: RIGHT;LEFT

## 2024-03-24 ASSESSMENT — PAIN DESCRIPTION - FREQUENCY: FREQUENCY: CONTINUOUS

## 2024-03-24 ASSESSMENT — PAIN DESCRIPTION - DESCRIPTORS
DESCRIPTORS: ACHING;CRAMPING
DESCRIPTORS: ACHING
DESCRIPTORS: ACHING;DISCOMFORT

## 2024-03-24 ASSESSMENT — PAIN - FUNCTIONAL ASSESSMENT
PAIN_FUNCTIONAL_ASSESSMENT: ACTIVITIES ARE NOT PREVENTED
PAIN_FUNCTIONAL_ASSESSMENT: PREVENTS OR INTERFERES WITH ALL ACTIVE AND SOME PASSIVE ACTIVITIES
PAIN_FUNCTIONAL_ASSESSMENT: ACTIVITIES ARE NOT PREVENTED

## 2024-03-24 ASSESSMENT — PAIN DESCRIPTION - LOCATION
LOCATION: FOOT

## 2024-03-24 ASSESSMENT — PAIN SCALES - GENERAL
PAINLEVEL_OUTOF10: 3
PAINLEVEL_OUTOF10: 8
PAINLEVEL_OUTOF10: 6

## 2024-03-24 ASSESSMENT — PAIN DESCRIPTION - PAIN TYPE
TYPE: ACUTE PAIN
TYPE: ACUTE PAIN;CHRONIC PAIN
TYPE: ACUTE PAIN

## 2024-03-24 ASSESSMENT — PAIN DESCRIPTION - ONSET: ONSET: ON-GOING

## 2024-03-24 NOTE — PLAN OF CARE
Problem: Discharge Planning  Goal: Discharge to home or other facility with appropriate resources  Outcome: Progressing     Problem: Neurosensory - Adult  Goal: Achieves stable or improved neurological status  Outcome: Progressing  Flowsheets (Taken 3/24/2024 0000 by Nadege Monteiro)  Achieves stable or improved neurological status: Assess for and report changes in neurological status  Goal: Absence of seizures  Outcome: Progressing  Goal: Remains free of injury related to seizures activity  Outcome: Progressing  Goal: Achieves maximal functionality and self care  Outcome: Progressing     Problem: Respiratory - Adult  Goal: Achieves optimal ventilation and oxygenation  Outcome: Progressing     Problem: Cardiovascular - Adult  Goal: Maintains optimal cardiac output and hemodynamic stability  Outcome: Progressing  Goal: Absence of cardiac dysrhythmias or at baseline  Outcome: Progressing     Problem: Safety - Adult  Goal: Free from fall injury  Outcome: Progressing     Problem: Pain  Goal: Verbalizes/displays adequate comfort level or baseline comfort level  Outcome: Progressing     Problem: Musculoskeletal - Adult  Goal: Return mobility to safest level of function  Outcome: Progressing  Goal: Return ADL status to a safe level of function  Outcome: Progressing

## 2024-03-24 NOTE — CARE COORDINATION
Call to [facility] to check status of cert. Remains pending at this time will check again later. Christina Ortiz RN

## 2024-03-25 PROCEDURE — 1200000000 HC SEMI PRIVATE

## 2024-03-25 PROCEDURE — 97530 THERAPEUTIC ACTIVITIES: CPT

## 2024-03-25 PROCEDURE — 2580000003 HC RX 258: Performed by: INTERNAL MEDICINE

## 2024-03-25 PROCEDURE — 6370000000 HC RX 637 (ALT 250 FOR IP): Performed by: NURSE PRACTITIONER

## 2024-03-25 PROCEDURE — 6370000000 HC RX 637 (ALT 250 FOR IP): Performed by: INTERNAL MEDICINE

## 2024-03-25 PROCEDURE — 6370000000 HC RX 637 (ALT 250 FOR IP)

## 2024-03-25 PROCEDURE — 6370000000 HC RX 637 (ALT 250 FOR IP): Performed by: NEUROMUSCULOSKELETAL MEDICINE & OMM

## 2024-03-25 PROCEDURE — 97110 THERAPEUTIC EXERCISES: CPT

## 2024-03-25 PROCEDURE — 6360000002 HC RX W HCPCS

## 2024-03-25 RX ORDER — GABAPENTIN 100 MG/1
200 CAPSULE ORAL 2 TIMES DAILY
Qty: 6 CAPSULE | Refills: 0 | Status: SHIPPED | OUTPATIENT
Start: 2024-03-25 | End: 2024-03-28

## 2024-03-25 RX ADMIN — DOCUSATE SODIUM 100 MG: 100 CAPSULE, LIQUID FILLED ORAL at 19:25

## 2024-03-25 RX ADMIN — ENOXAPARIN SODIUM 30 MG: 100 INJECTION SUBCUTANEOUS at 19:26

## 2024-03-25 RX ADMIN — OXYCODONE AND ACETAMINOPHEN 1 TABLET: 5; 325 TABLET ORAL at 21:45

## 2024-03-25 RX ADMIN — ENOXAPARIN SODIUM 30 MG: 100 INJECTION SUBCUTANEOUS at 08:51

## 2024-03-25 RX ADMIN — Medication 10 ML: at 08:52

## 2024-03-25 RX ADMIN — ASPIRIN 81 MG 81 MG: 81 TABLET ORAL at 08:51

## 2024-03-25 RX ADMIN — ATORVASTATIN CALCIUM 40 MG: 40 TABLET, FILM COATED ORAL at 19:26

## 2024-03-25 RX ADMIN — GABAPENTIN 200 MG: 100 CAPSULE ORAL at 19:25

## 2024-03-25 RX ADMIN — GABAPENTIN 200 MG: 100 CAPSULE ORAL at 08:51

## 2024-03-25 RX ADMIN — DOCUSATE SODIUM 100 MG: 100 CAPSULE, LIQUID FILLED ORAL at 08:51

## 2024-03-25 RX ADMIN — CLOPIDOGREL BISULFATE 75 MG: 75 TABLET ORAL at 08:51

## 2024-03-25 RX ADMIN — Medication 10 ML: at 19:29

## 2024-03-25 ASSESSMENT — PAIN DESCRIPTION - ORIENTATION
ORIENTATION: LEFT;RIGHT
ORIENTATION: LEFT
ORIENTATION: LEFT;RIGHT

## 2024-03-25 ASSESSMENT — PAIN DESCRIPTION - DESCRIPTORS
DESCRIPTORS: DISCOMFORT
DESCRIPTORS: ACHING
DESCRIPTORS: ACHING

## 2024-03-25 ASSESSMENT — PAIN SCALES - GENERAL
PAINLEVEL_OUTOF10: 2
PAINLEVEL_OUTOF10: 3
PAINLEVEL_OUTOF10: 3

## 2024-03-25 ASSESSMENT — PAIN DESCRIPTION - LOCATION
LOCATION: FOOT

## 2024-03-25 NOTE — DISCHARGE INSTR - COC
ACCESS:404079954}    Nursing Mobility/ADLs:  Walking   {[facility] DME ADLs:858242471}  Transfer  {CHP DME ADLs:174462506}  Bathing  {CHP DME ADLs:585673011}  Dressing  {CHP DME ADLs:559158669}  Toileting  {CHP DME ADLs:300523706}  Feeding  {CHP DME ADLs:093557776}  Med Admin  {CHP DME ADLs:548991879}  Med Delivery   { [facility] MED Delivery:264881095}    Wound Care Documentation and Therapy:        Elimination:  Continence:   Bowel: {YES / NO:}  Bladder: {YES / NO:}  Urinary Catheter: {Urinary Catheter:175320543}   Colostomy/Ileostomy/Ileal Conduit: {YES / NO:}       Date of Last BM: ***    Intake/Output Summary (Last 24 hours) at 3/25/2024 0836  Last data filed at 3/24/2024 2336  Gross per 24 hour   Intake --   Output 1275 ml   Net -1275 ml     I/O last 3 completed shifts:  In: -   Out: 1275 [Urine:1275]    Safety Concerns:     { [facility] Safety Concerns:737498443}    Impairments/Disabilities:      { [facility] Impairments/Disabilities:255160385}    Nutrition Therapy:  Current Nutrition Therapy:   { [facility] Diet List:040926806}    Routes of Feeding: {P DME Other Feedings:239998003}  Liquids: {Slp liquid thickness:21416}  Daily Fluid Restriction: {P DME Yes amt example:504695250}  Last Modified Barium Swallow with Video (Video Swallowing Test): {Done Not Done Date:}    Treatments at the Time of Hospital Discharge:   Respiratory Treatments: ***  Oxygen Therapy:  {Therapy; copd oxygen:11102}  Ventilator:    { CC Vent List:978078949}    Rehab Therapies: {THERAPEUTIC INTERVENTION:4577358363}  Weight Bearing Status/Restrictions: {[facility] Weight Bearin}  Other Medical Equipment (for information only, NOT a DME order):  {EQUIPMENT:437945427}  Other Treatments: ***    Patient's personal belongings (please select all that are sent with patient):  {[facility] DME Belongings:332902247}    RN SIGNATURE:  {Esignature:415859126}    CASE MANAGEMENT/SOCIAL WORK SECTION    Inpatient Status Date: 3/16/24    Readmission

## 2024-03-25 NOTE — PLAN OF CARE
Problem: Discharge Planning  Goal: Discharge to home or other facility with appropriate resources  3/25/2024 1055 by PHILIPP GIORDANO  Outcome: Progressing  3/25/2024 0152 by Abdifatah Wade LPN  Outcome: Progressing  Flowsheets (Taken 3/24/2024 2115)  Discharge to home or other facility with appropriate resources:   Identify barriers to discharge with patient and caregiver   Arrange for needed discharge resources and transportation as appropriate   Identify discharge learning needs (meds, wound care, etc)     Problem: Neurosensory - Adult  Goal: Achieves stable or improved neurological status  3/25/2024 1055 by PHILIPP GIORDANO  Outcome: Progressing  3/25/2024 0152 by Abdifatah Wade LPN  Outcome: Progressing  Flowsheets (Taken 3/24/2024 2115)  Achieves stable or improved neurological status: Assess for and report changes in neurological status  Goal: Absence of seizures  Outcome: Progressing  Goal: Remains free of injury related to seizures activity  Outcome: Progressing  Goal: Achieves maximal functionality and self care  3/25/2024 1055 by PHILIPP GIORDANO  Outcome: Progressing  3/25/2024 0152 by Abdifatah Wade LPN  Outcome: Progressing     Problem: Respiratory - Adult  Goal: Achieves optimal ventilation and oxygenation  3/25/2024 1055 by PHILIPP GIORDANO  Outcome: Progressing  3/25/2024 0152 by Abdifatah Wade LPN  Outcome: Progressing  Flowsheets (Taken 3/24/2024 2115)  Achieves optimal ventilation and oxygenation: Assess for changes in respiratory status     Problem: Cardiovascular - Adult  Goal: Maintains optimal cardiac output and hemodynamic stability  3/25/2024 1055 by PHILIPP GIORDANO  Outcome: Progressing  3/25/2024 0152 by Abdifatah Wade LPN  Outcome: Progressing  Goal: Absence of cardiac dysrhythmias or at baseline  3/25/2024 1055 by PHILIPP GIORDANO  Outcome: Progressing  3/25/2024 0152 by Abdifatah Wade LPN  Outcome: Progressing     Problem: Safety - Adult  Goal: Free from fall

## 2024-03-25 NOTE — PLAN OF CARE
Problem: Discharge Planning  Goal: Discharge to home or other facility with appropriate resources  3/25/2024 1111 by PHILIPP GIORDANO  Outcome: Adequate for Discharge  3/25/2024 1055 by PHILIPP GIORDANO  Outcome: Progressing  3/25/2024 0152 by Abdifatah Wade LPN  Outcome: Progressing  Flowsheets (Taken 3/24/2024 2115)  Discharge to home or other facility with appropriate resources:   Identify barriers to discharge with patient and caregiver   Arrange for needed discharge resources and transportation as appropriate   Identify discharge learning needs (meds, wound care, etc)     Problem: Neurosensory - Adult  Goal: Achieves stable or improved neurological status  3/25/2024 1111 by PHILIPP GIORDANO  Outcome: Adequate for Discharge  3/25/2024 1055 by PHILIPP GIORDANO  Outcome: Progressing  3/25/2024 0152 by Abdifatah Wdae LPN  Outcome: Progressing  Flowsheets (Taken 3/24/2024 2115)  Achieves stable or improved neurological status: Assess for and report changes in neurological status  Goal: Absence of seizures  3/25/2024 1111 by PHILIPP GIORDANO  Outcome: Adequate for Discharge  3/25/2024 1055 by PHILIPP GIORDANO  Outcome: Progressing  Goal: Remains free of injury related to seizures activity  3/25/2024 1111 by PHILIPP GIORDANO  Outcome: Adequate for Discharge  3/25/2024 1055 by PHILIPP GIORDANO  Outcome: Progressing  Goal: Achieves maximal functionality and self care  3/25/2024 1111 by PHILIPP GIORDANO  Outcome: Adequate for Discharge  3/25/2024 1055 by PHILIPP GIORDANO  Outcome: Progressing  3/25/2024 0152 by Abdifatah Wade LPN  Outcome: Progressing     Problem: Respiratory - Adult  Goal: Achieves optimal ventilation and oxygenation  3/25/2024 1111 by PHILIPP GIORDANO  Outcome: Adequate for Discharge  3/25/2024 1055 by PHILIPP GIORDANO  Outcome: Progressing  3/25/2024 0152 by Abdifatah Wade LPN  Outcome: Progressing  Flowsheets (Taken 3/24/2024 2115)  Achieves optimal ventilation and oxygenation: Assess for

## 2024-03-25 NOTE — CARE COORDINATION
Bernie sent to [facility] Blum \"P2P offered: [phone], option #4, ask for Dr.Jill You by 1200 Tuesday. Subscriber ID:[id number].\" Electronically signed by AUSTIN MELLO RN on 3/25/2024 at 3:57 PM

## 2024-03-25 NOTE — CARE COORDINATION
Chart reviewed day 9. Avoidable day #7. Pt is followed by IM. Cert pending to [facility], placed on 3/22. Writer spoke w/Shahla this am to confirm cert status. Will follow for needs as they arise. Electronically signed by AUSTIN MELLO RN on 3/25/2024 at 9:07 AM

## 2024-03-25 NOTE — PLAN OF CARE
Problem: Discharge Planning  Goal: Discharge to home or other facility with appropriate resources  Outcome: Progressing  Flowsheets (Taken 3/24/2024 2115)  Discharge to home or other facility with appropriate resources:   Identify barriers to discharge with patient and caregiver   Arrange for needed discharge resources and transportation as appropriate   Identify discharge learning needs (meds, wound care, etc)     Problem: Neurosensory - Adult  Goal: Achieves stable or improved neurological status  Outcome: Progressing  Flowsheets (Taken 3/24/2024 2115)  Achieves stable or improved neurological status: Assess for and report changes in neurological status  Goal: Achieves maximal functionality and self care  Outcome: Progressing     Problem: Respiratory - Adult  Goal: Achieves optimal ventilation and oxygenation  Outcome: Progressing  Flowsheets (Taken 3/24/2024 2115)  Achieves optimal ventilation and oxygenation: Assess for changes in respiratory status     Problem: Cardiovascular - Adult  Goal: Maintains optimal cardiac output and hemodynamic stability  Outcome: Progressing  Goal: Absence of cardiac dysrhythmias or at baseline  Outcome: Progressing     Problem: Safety - Adult  Goal: Free from fall injury  Outcome: Progressing     Problem: Pain  Goal: Verbalizes/displays adequate comfort level or baseline comfort level  Outcome: Progressing  Flowsheets (Taken 3/24/2024 1915)  Verbalizes/displays adequate comfort level or baseline comfort level:   Assess pain using appropriate pain scale   Encourage patient to monitor pain and request assistance   Administer analgesics based on type and severity of pain and evaluate response   Implement non-pharmacological measures as appropriate and evaluate response     Problem: Musculoskeletal - Adult  Goal: Return mobility to safest level of function  Outcome: Progressing  Goal: Return ADL status to a safe level of function  Outcome: Progressing

## 2024-03-26 VITALS
SYSTOLIC BLOOD PRESSURE: 146 MMHG | OXYGEN SATURATION: 96 % | WEIGHT: 224.5 LBS | TEMPERATURE: 98.3 F | HEIGHT: 70 IN | RESPIRATION RATE: 20 BRPM | BODY MASS INDEX: 32.14 KG/M2 | HEART RATE: 68 BPM | DIASTOLIC BLOOD PRESSURE: 83 MMHG

## 2024-03-26 PROCEDURE — 6370000000 HC RX 637 (ALT 250 FOR IP): Performed by: NEUROMUSCULOSKELETAL MEDICINE & OMM

## 2024-03-26 PROCEDURE — 97116 GAIT TRAINING THERAPY: CPT

## 2024-03-26 PROCEDURE — 97530 THERAPEUTIC ACTIVITIES: CPT

## 2024-03-26 PROCEDURE — 6370000000 HC RX 637 (ALT 250 FOR IP): Performed by: NURSE PRACTITIONER

## 2024-03-26 PROCEDURE — 97110 THERAPEUTIC EXERCISES: CPT

## 2024-03-26 PROCEDURE — 6370000000 HC RX 637 (ALT 250 FOR IP)

## 2024-03-26 PROCEDURE — 6360000002 HC RX W HCPCS

## 2024-03-26 PROCEDURE — 6370000000 HC RX 637 (ALT 250 FOR IP): Performed by: INTERNAL MEDICINE

## 2024-03-26 PROCEDURE — 2580000003 HC RX 258: Performed by: INTERNAL MEDICINE

## 2024-03-26 RX ADMIN — ENOXAPARIN SODIUM 30 MG: 100 INJECTION SUBCUTANEOUS at 10:51

## 2024-03-26 RX ADMIN — ASPIRIN 81 MG 81 MG: 81 TABLET ORAL at 10:50

## 2024-03-26 RX ADMIN — Medication 10 ML: at 10:52

## 2024-03-26 RX ADMIN — DOCUSATE SODIUM 100 MG: 100 CAPSULE, LIQUID FILLED ORAL at 10:50

## 2024-03-26 RX ADMIN — GABAPENTIN 200 MG: 100 CAPSULE ORAL at 10:50

## 2024-03-26 RX ADMIN — CLOPIDOGREL BISULFATE 75 MG: 75 TABLET ORAL at 10:49

## 2024-03-26 ASSESSMENT — PAIN SCALES - GENERAL
PAINLEVEL_OUTOF10: 0
PAINLEVEL_OUTOF10: 0

## 2024-03-26 NOTE — CARE COORDINATION
CASE MANAGEMENT DISCHARGE SUMMARY      Discharge to: Home w/father. [facility] SN,PT,OT.     Detailed conversation w/pt & Sister melanie r/t denial of insurance for IPR & SNF. Explain that [facility] will provide services at home. Writer placed call to OT to assist in assessing pts ability to walk up stair, PT to follow up.    IMM given: 3/26/24    New Durable Medical Equipment ordered/agency: RW through [facility]    Transportation: private w/family.    Confirmed discharge plan with:     Patient: yes.   Family:  Pat [phone]     Facility/Agency, name:  [facility] electronic/fax and confirmed w/Gigi.       RN, name:     Note: Discharging nurse to complete [facility], reconcile [facility], and place final copy with patient's discharge packet.

## 2024-03-26 NOTE — CARE COORDINATION
Chart reviewed day 10. Avoidable day #8. Pt is followed by IM. AMEYA Hopper-CNP attempted to complete P2P, no answer VM left. In anticipation of denial referral placed to [facility] for [facility]. Pt from home w/father. Will follow for needs as they arise. Electronically signed by AUSTIN MELLO RN on 3/26/2024 at 8:36 AM

## 2024-03-26 NOTE — PROGRESS NOTES
Hospital Medicine Progress Note      Date of Admission: 3/16/2024  Hospital Day: 10    Chief Admission Complaint:  left-sided numbness    Subjective: improved L foot symptoms, still present intermittently    Presenting Admission History: \"58 y.o. male with a pmh of essential HTN who presents with Stroke-like symptom. He is coming in as a transfert from [facility] where he initially presented on account of acute onset left-sided numbness and tingling.   The patient is hard of hearing and has dysarthria. It was hard to always understand what he said. Therefore some of the story was gathered from the EMR. Per report, the patient's symptoms started at 5:45 PM on 3/35. He also complained of substernal chest pain at that time as well. In the ER, he was mildly hypertensive with otherwise normal VS. His initial blood work was benign. Head CT, CTA of the head and neck were negative for any acute findings. He has been transferred to [facility] for further work up.\"    Assessment/Plan:      Current Principal Problem:  Stroke-like symptom    Left-sided weakness.  CT head: no acute intracranial abnormalities.  CTA head/neck: no LVO.  MRI brain: subcortical and cortical lesions w/ abnormal enhancement; no acute infarct.  MRA brain: no evidence of dural AVF.  EEG was normal.  MRV head: no evidence of venous sinus thrombosis.  Per neuro note, suspect subacute ischemic stroke.  PT/OT.  Monitor on telemetry.  Neuro consulted, appreciate recommendations.  Continue ASA, statin.  Recommend repeat MRI brain with/without contrast in ~1 month.     Hyperlipidemia, uncontrolled.  LDL noted to be 104 this admission.  Continue  statin    Essential hypertension.  Was on lisinopril 40mg daily at home, this was not resumed here as BP appears well controlled without it.  Monitor    History of atrial fibrillation.      Obesity.  Body mass index is 32.21 kg/m².  Complicating assessment and treatment.  Placing patient at risk for multiple 
  Hospital Medicine Progress Note      Date of Admission: 3/16/2024  Hospital Day: 4    Chief Admission Complaint:  left-sided numbness    Subjective:  denies dizziness, chest pain, dyspnea.  Reports ongoing left sided symptoms, states they have not worsened    Presenting Admission History: \"58 y.o. male with a pmh of essential HTN who presents with Stroke-like symptom. He is coming in as a transfert from [facility] where he initially presented on account of acute onset left-sided numbness and tingling.   The patient is hard of hearing and has dysarthria. It was hard to always understand what he said. Therefore some of the story was gathered from the EMR. Per report, the patient's symptoms started at 5:45 PM on 3/35. He also complained of substernal chest pain at that time as well. In the ER, he was mildly hypertensive with otherwise normal VS. His initial blood work was benign. Head CT, CTA of the head and neck were negative for any acute findings. He has been transferred to [facility] for further work up.\"    Assessment/Plan:      Current Principal Problem:  Stroke-like symptom    Left-sided weakness.  CT head: no acute intracranial abnormalities.  CTA head/neck: no LVO.  MRI brain: subcortical and cortical lesions w/ abnormal enhancement; no acute infarct, hemorrhage, mass effect.  MRA brain: no evidence of dural AVF.  EEG was normal.  MRV head: no evidence of venous sinus thrombosis.  Per neuro note, suspect subacute ischemic stroke.  PT/OT.  Monitor on telemetry.  Neuro consulted, appreciate recommendations.  Continue ASA, statin.  Recommend repeat MRI brain with/without contrast in ~1 month.     Hyperlipidemia, uncontrolled.  LDL noted to be 104 this admission.  Continue  statin    History of atrial fibrillation.      Obesity.  Body mass index is 33.02 kg/m².  Complicating assessment and treatment.  Placing patient at risk for multiple co-morbidities as well as early death and contributing to the 
  Hospital Medicine Progress Note      Date of Admission: 3/16/2024  Hospital Day: 6    Chief Admission Complaint:  left-sided numbness    Subjective:  states some improvement in L sided symptoms    Presenting Admission History: \"58 y.o. male with a pmh of essential HTN who presents with Stroke-like symptom. He is coming in as a transfert from [facility] where he initially presented on account of acute onset left-sided numbness and tingling.   The patient is hard of hearing and has dysarthria. It was hard to always understand what he said. Therefore some of the story was gathered from the EMR. Per report, the patient's symptoms started at 5:45 PM on 3/35. He also complained of substernal chest pain at that time as well. In the ER, he was mildly hypertensive with otherwise normal VS. His initial blood work was benign. Head CT, CTA of the head and neck were negative for any acute findings. He has been transferred to [facility] for further work up.\"    Assessment/Plan:      Current Principal Problem:  Stroke-like symptom    Left-sided weakness.  CT head: no acute intracranial abnormalities.  CTA head/neck: no LVO.  MRI brain: subcortical and cortical lesions w/ abnormal enhancement; no acute infarct.  MRA brain: no evidence of dural AVF.  EEG was normal.  MRV head: no evidence of venous sinus thrombosis.  Per neuro note, suspect subacute ischemic stroke.  PT/OT.  Monitor on telemetry.  Neuro consulted, appreciate recommendations.  Continue ASA, statin.  Recommend repeat MRI brain with/without contrast in ~1 month.     Hyperlipidemia, uncontrolled.  LDL noted to be 104 this admission.  Continue  statin    History of atrial fibrillation.      Obesity.  Body mass index is 33.02 kg/m².  Complicating assessment and treatment.  Placing patient at risk for multiple co-morbidities as well as early death and contributing to the patient's presentation.  Counseled on weight loss.  [x] Class I - 30.0 to 34.9 kg/m2  [] 
  Hospital Medicine Progress Note      Date of Admission: 3/16/2024  Hospital Day: 7    Chief Admission Complaint:  left-sided numbness    Subjective:  reports intermittent L foot pain    Presenting Admission History: \"58 y.o. male with a pmh of essential HTN who presents with Stroke-like symptom. He is coming in as a transfert from [facility] where he initially presented on account of acute onset left-sided numbness and tingling.   The patient is hard of hearing and has dysarthria. It was hard to always understand what he said. Therefore some of the story was gathered from the EMR. Per report, the patient's symptoms started at 5:45 PM on 3/35. He also complained of substernal chest pain at that time as well. In the ER, he was mildly hypertensive with otherwise normal VS. His initial blood work was benign. Head CT, CTA of the head and neck were negative for any acute findings. He has been transferred to [facility] for further work up.\"    Assessment/Plan:      Current Principal Problem:  Stroke-like symptom    Left-sided weakness.  CT head: no acute intracranial abnormalities.  CTA head/neck: no LVO.  MRI brain: subcortical and cortical lesions w/ abnormal enhancement; no acute infarct.  MRA brain: no evidence of dural AVF.  EEG was normal.  MRV head: no evidence of venous sinus thrombosis.  Per neuro note, suspect subacute ischemic stroke.  PT/OT.  Monitor on telemetry.  Neuro consulted, appreciate recommendations.  Continue ASA, statin.  Recommend repeat MRI brain with/without contrast in ~1 month.     Hyperlipidemia, uncontrolled.  LDL noted to be 104 this admission.  Continue  statin    History of atrial fibrillation.      Obesity.  Body mass index is 33.02 kg/m².  Complicating assessment and treatment.  Placing patient at risk for multiple co-morbidities as well as early death and contributing to the patient's presentation.  Counseled on weight loss.  [x] Class I - 30.0 to 34.9 kg/m2  [] Class II - 35.0 
  Hospital Medicine Progress Note      Date of Admission: 3/16/2024  Hospital Day: 8    Chief Admission Complaint:  left-sided numbness    Subjective: reports continued improvement in L sided symptoms    Presenting Admission History: \"58 y.o. male with a pmh of essential HTN who presents with Stroke-like symptom. He is coming in as a transfert from [facility] where he initially presented on account of acute onset left-sided numbness and tingling.   The patient is hard of hearing and has dysarthria. It was hard to always understand what he said. Therefore some of the story was gathered from the EMR. Per report, the patient's symptoms started at 5:45 PM on 3/35. He also complained of substernal chest pain at that time as well. In the ER, he was mildly hypertensive with otherwise normal VS. His initial blood work was benign. Head CT, CTA of the head and neck were negative for any acute findings. He has been transferred to [facility] for further work up.\"    Assessment/Plan:      Current Principal Problem:  Stroke-like symptom    Left-sided weakness.  CT head: no acute intracranial abnormalities.  CTA head/neck: no LVO.  MRI brain: subcortical and cortical lesions w/ abnormal enhancement; no acute infarct.  MRA brain: no evidence of dural AVF.  EEG was normal.  MRV head: no evidence of venous sinus thrombosis.  Per neuro note, suspect subacute ischemic stroke.  PT/OT.  Monitor on telemetry.  Neuro consulted, appreciate recommendations.  Continue ASA, statin.  Recommend repeat MRI brain with/without contrast in ~1 month.     Hyperlipidemia, uncontrolled.  LDL noted to be 104 this admission.  Continue  statin    History of atrial fibrillation.      Obesity.  Body mass index is 33.02 kg/m².  Complicating assessment and treatment.  Placing patient at risk for multiple co-morbidities as well as early death and contributing to the patient's presentation.  Counseled on weight loss.  [x] Class I - 30.0 to 34.9 kg/m2  [] 
  Hospital Medicine Progress Note      Date of Admission: 3/16/2024  Hospital Day: 9    Chief Admission Complaint:  left-sided numbness    Subjective: reports intermittent pain/tingling in L foot    Presenting Admission History: \"58 y.o. male with a pmh of essential HTN who presents with Stroke-like symptom. He is coming in as a transfert from [facility] where he initially presented on account of acute onset left-sided numbness and tingling.   The patient is hard of hearing and has dysarthria. It was hard to always understand what he said. Therefore some of the story was gathered from the EMR. Per report, the patient's symptoms started at 5:45 PM on 3/35. He also complained of substernal chest pain at that time as well. In the ER, he was mildly hypertensive with otherwise normal VS. His initial blood work was benign. Head CT, CTA of the head and neck were negative for any acute findings. He has been transferred to [facility] for further work up.\"    Assessment/Plan:      Current Principal Problem:  Stroke-like symptom    Left-sided weakness.  CT head: no acute intracranial abnormalities.  CTA head/neck: no LVO.  MRI brain: subcortical and cortical lesions w/ abnormal enhancement; no acute infarct.  MRA brain: no evidence of dural AVF.  EEG was normal.  MRV head: no evidence of venous sinus thrombosis.  Per neuro note, suspect subacute ischemic stroke.  PT/OT.  Monitor on telemetry.  Neuro consulted, appreciate recommendations.  Continue ASA, statin.  Recommend repeat MRI brain with/without contrast in ~1 month.     Hyperlipidemia, uncontrolled.  LDL noted to be 104 this admission.  Continue  statin    Essential hypertension.  Was on lisinopril 40mg daily at home, this was not resumed here as BP appears well controlled without it.  Monitor    History of atrial fibrillation.      Obesity.  Body mass index is 32.21 kg/m².  Complicating assessment and treatment.  Placing patient at risk for multiple 
  Speech Language Pathology  Clinical Bedside Swallow Assessment  Facility/Department: [facility] C5 - MED SURG/ORTHO        Recommendations:  Diet recommendation: IDDSI 7 Regular Solids; IDDSI 0 Thin Liquids; Meds whole with thin liquids  Instrumentation: Not clinically indicated at this time. Will continue to monitor  Risk management: upright for all intake, stay upright for at least 30 mins after intake, small bites/sips, general GERD precautions, general aspiration precautions, and hold PO and contact SLP if s/s of aspiration or worsening respiratory status develop.      NAME:Irwin Dillon  : 1965 (58 y.o.)   MRN: [id number]  ROOM: [address] [place] [address]  ADMISSION DATE: 3/16/2024  PATIENT DIAGNOSIS(ES): Stroke-like symptom [R29.90]  Left sided numbness [R20.0]  No chief complaint on file.    Patient Active Problem List    Diagnosis Date Noted    Stroke-like symptom 2024    Left sided numbness 2024    Hard of hearing 2024    Dysarthria 2024    Essential hypertension, benign 2006     Past Medical History:   Diagnosis Date    Essential hypertension, benign 06     History reviewed. No pertinent surgical history.  No Known Allergies    DATE ONSET: 3/16/2024    Date of Evaluation: 3/16/2024   Evaluating Therapist: NICOLAS MAHER    Chart Reviewed: : [x] Yes [] No     Pain: The patient does not complain of pain     Current Diet: ADULT DIET; Regular      Most Recent Imaging    MRI brain without contrast    (Results Pending)   Vascular carotid duplex bilateral    (Results Pending)         Reason for Referral  Irwin Dillon was referred for a bedside swallow evaluation to assess the efficiency of their swallow function, identify signs and symptoms of aspiration and make recommendations regarding safe dietary consistencies, effective compensatory strategies, and safe eating environment.    Assessment    Medical record review/interview: Per MD H&P on 3/16/24: \"Irwin Dillon is 
4 Eyes Skin Assessment     The patient is being assess for  Admission    I agree that 2 RN's have performed a thorough Head to Toe Skin Assessment on the patient. ALL assessment sites listed below have been assessed.       Areas assessed by both nurses:   [x]   Head, Face, and Ears   [x]   Shoulders, Back, and Chest  [x]   Arms, Elbows, and Hands   [x]   Coccyx, Sacrum, and Ischum  [x]   Legs, Feet, and Heels        Does the Patient have Skin Breakdown?  No-No DTI's or pressure injuries noted upon admission.        Lukas Prevention initiated:  SVETLANA   Wound Care Orders initiated:  NA      [facility] nurse consulted for Pressure Injury (Stage 3,4, Unstageable, DTI, NWPT, and Complex wounds):  NA      Nurse 1 eSignature: Electronically signed by Kelly Ellsworth RN on 3/16/24 at 3:17 AM EDT    **SHARE this note so that the co-signing nurse is able to place an eSignature**    Nurse 2 eSignature: Electronically signed by Candida Kruse RN on 3/16/24 at 3:19 AM EDT  Electronically signed by Candida Kruse RN on 3/16/24 at 3:19 AM EDT         
Call placed to sister Astrid with update regarding patients status. Astrid requesting a call from a physician to go over the scans that have been completed. Perfect Serve sent to Yanelis Blum asking to call  with update at this time.   
Comprehensive Nutrition Assessment    Type and Reason for Visit:  Initial, RD Nutrition Re-Screen/LOS    Nutrition Recommendations/Plan:   Continue regular diet and encourage PO intake   RD to order updated weight  Monitor nutrition adequacy, pertinent labs, bowel habits, wt changes, and clinical progress     Malnutrition Assessment:  Malnutrition Status:  At risk for malnutrition (Comment) (03/22/24 7894)    Context:  Acute Illness       Nutrition Assessment:    LOS assessment: 58 y.o. m w/ pmh of HTN admitted w/ Left hemiparesis, suspected subacute ischemic stroke or cortical vein thrombosis. On regular diet, PO intakes % of meals in EMR. Pt is deaf but able to read lips. Reports good intake, eating most meals. Reports stable weight. Encouraged protein intake, pt compliant. No additional questions at this time. Possible d/c to ARU. Continue to encourage PO intake, will continue to monitor.    Nutrition Related Findings:    BLE non-pitting edema. + BM 3/18. No updated labs. Wound Type: None       Current Nutrition Intake & Therapies:    Average Meal Intake: %  Average Supplements Intake: None Ordered  ADULT DIET; Regular    Anthropometric Measures:  Height: 177.8 cm (5' 10\")  Ideal Body Weight (IBW): 166 lbs (75 kg)       Current Body Weight: 104.3 kg (230 lb), 138.6 % IBW. Weight Source: Bed Scale  Current BMI (kg/m2): 33        Weight Adjustment For: No Adjustment                 BMI Categories: Obese Class 1 (BMI 30.0-34.9)      Nutrition Diagnosis:   No nutrition diagnosis at this time     Nutrition Interventions:   Food and/or Nutrient Delivery: Continue Current Diet  Nutrition Education/Counseling: Education not appropriate  Coordination of Nutrition Care: Continue to monitor while inpatient       Goals:     Goals: prior to discharge, PO intake 50% or greater       Nutrition Monitoring and Evaluation:   Behavioral-Environmental Outcomes: None Identified  Food/Nutrient Intake Outcomes: Food and 
Consult Call Back    Who:Dr Chung Ulloa at bedside  Date:3/16/2024,  Time:3:42 PM    Electronically signed by Alix Walden RN on 3/16/24 at 3:42 PM EDT    
Hospitalist updated on epistaxis x2 . Faily large and stopping on its own  
Left message for return call for P2P @ 0823    Electronically signed by AMEYA Hopper CNP on 3/26/24 at 8:23 AM EDT      
Neurology Progress Note    ID: Irwin Dillon is a 58 y.o. male    : 1965     LOS: 2 days     ASSESSMENT    1.  Left sided weakness.  2.  Abnormal MRI brain with subcortical and cortical lesions with abnormal enhancement.  3.  History of atrial fibrillation status post cardioversion 15 years ago.    The patient remains to have clinical left hemiparesis.  However, this cannot explain by abnormal MRI brain as this is unilateral lesion.  The MRA head showed no evidence of dural AVF.    Based on the history, the patient may needs MRV head to exclude cortical vein thrombosis and EEG to exclude Angel's paralysis from subclinical epileptic event.    PLAN    1.  Continue aspirin and statin.  2.  PT/OT/ST.  3.  MRV head with contrast.  4.  EEG.  5.  Continue cardiac telemetry.    Medications:  Scheduled Meds:    sodium chloride flush  5-40 mL IntraVENous 2 times per day    atorvastatin  40 mg Oral Nightly    aspirin  81 mg Oral Daily    Or    aspirin  300 mg Rectal Daily    clopidogrel  75 mg Oral Daily    docusate sodium  100 mg Oral BID     Continuous Infusions:    sodium chloride       PRN Meds: sodium chloride flush, sodium chloride, ondansetron **OR** ondansetron, polyethylene glycol, perflutren lipid microspheres, perflutren lipid microspheres    OBJECTIVE  Vital signs in the last 24 hours:  Vitals:    24 0806   BP: (!) 149/91   Pulse: 67   Resp: 16   Temp: 98 °F (36.7 °C)   SpO2: 95%       Intake/Output last 3 shifts:  No intake/output data recorded.    Intake/Output this shift:  No intake/output data recorded.    Yesterday's Weight:  Wt Readings from Last 1 Encounters:   24 104.4 kg (230 lb 2.6 oz)       SUBJECTIVE  There was no acute event overnight.    ROS:  Negative except in subjective.    Neurological examination:  MENTAL STATUS:  Alert and oriented to person.  LANG/SPEECH: No dysarthria.  CRANIAL NERVES: No facial asymmetry.  MOTOR: Left hemiparesis 4+/5.  REFLEXES: Generalized 
Neurology Progress Note    ID: Irwin Dillon is a 58 y.o. male    : 1965     LOS: 3 days     ASSESSMENT    1.  Left sided weakness.  2.  Abnormal MRI brain with subcortical and cortical lesions with abnormal enhancement.  3.  History of atrial fibrillation status post cardioversion 15 years ago.  4.  Left ankle and foot arthritis.    The patient remains to have clinical left hemiparesis.  However, this cannot explain by abnormal MRI brain as this is unilateral lesion.  The MRA head showed no evidence of dural AVF.  The MRV head showed no evidence of major venous sinus thrombosis.  EEG showed no evidence of seizure tendency.    Overall, the MRI brain is suspicious for subacute ischemic stroke or cortical vein thrombosis.  This is not typical for malignancy of encephalitis.    However, those etiologies cannot completely excluded.  The patient has no clinical suspicion for CNS infection.    So the lumbar puncture has a low yield at this time.  Neurology will postpone lumbar puncture.  The patient needs a follow-up MRI brain with and without contrast in 1 month for confirmation.    24: The patient remains to have clinical left arm numbness and left lower leg weakness.  He also complains significant joint pain and on his left foot and left ankle today.    PLAN    1.  Continue aspirin and statin.  2.  PT/OT/ST.  3.  Management of joint pain per primary team.  4.  Continue cardiac telemetry.    The patient can be discharged once the patient is clinically stabilized.    Medications:  Scheduled Meds:    sodium chloride flush  5-40 mL IntraVENous 2 times per day    atorvastatin  40 mg Oral Nightly    aspirin  81 mg Oral Daily    Or    aspirin  300 mg Rectal Daily    clopidogrel  75 mg Oral Daily    docusate sodium  100 mg Oral BID     Continuous Infusions:    sodium chloride       PRN Meds: oxyCODONE-acetaminophen, sodium chloride flush, sodium chloride, ondansetron **OR** ondansetron, polyethylene glycol, 
Neurology Progress Note    ID: Irwin Dillon is a 58 y.o. male    : 1965     LOS: 4 days     ASSESSMENT    1.  Left sided weakness.  2.  Abnormal MRI brain with subcortical and cortical lesions with abnormal enhancement.  3.  History of atrial fibrillation status post cardioversion 15 years ago.  4.  Left ankle and foot arthritis.    The patient remains to have clinical left hemiparesis.  However, this cannot explain by abnormal MRI brain as this is unilateral lesion.  The MRA head showed no evidence of dural AVF.  The MRV head showed no evidence of major venous sinus thrombosis.  EEG showed no evidence of seizure tendency.    Overall, the MRI brain is suspicious for subacute ischemic stroke or cortical vein thrombosis.  This is not typical for malignancy of encephalitis.    However, those etiologies cannot completely excluded.  The patient has no clinical suspicion for CNS infection.    So the lumbar puncture has a low yield at this time.  Neurology will postpone lumbar puncture.  The patient needs a follow-up MRI brain with and without contrast in 1 month for confirmation.    24: The patient remains to have clinical left arm numbness and left lower leg weakness.  He also complains significant joint pain and on his left foot and left ankle today.    24: The patient is neurologically stabilized.  The patient remains to have mild degree of weakness on left lower leg.  The patient has no significant weakness on the left upper extremity today.  The patient is planned for ARU.    PLAN    1.  Continue aspirin and statin.  2.  PT/OT/ST.  3.  Management of joint pain per primary team.  4.  Continue cardiac telemetry.    The patient can be discharged once the patient is clinically stabilized.  Neurology will sign off.  Follow-up with neurology with 4 to 6 weeks.  Plan to do MRI brain with follow-up appointment.    Medications:  Scheduled Meds:    enoxaparin  30 mg SubCUTAneous BID    sodium chloride 
Occupational Therapy    Attempted to see pt for OT tx. Lunch arrived upon entry. Offered to assist pt to chair but pt declines OOB activity at this time. Will follow up as schedule allows.     Mckenna Dejesus OTR/L  
Occupational Therapy  Facility/Department: [facility] C5 - MED SURG/ORTHO  Treatment Note    Name: Irwin Dillon  : 1965  MRN: [id number]  Date of Service: 3/23/2024    Discharge Recommendations:  IP Rehab  Therapy discharge recommendations take into account each patient's current medical complexities and are subject to input/oversight from the patient's healthcare team.   Barriers to Home Discharge:   [x] Steps to access home entry or bed/bath:   [x] Unable to transfer, ambulate, or propel wheelchair household distances without assist   [x] Limited available assist at home upon discharge    [x] Patient or family requests d/c to post-acute facility       If pt is unable to be seen after this session, please let this note serve as discharge summary.  Please see case management note for discharge disposition.  Thank you.           Patient Diagnosis(es): There were no encounter diagnoses.  Past Medical History:  has a past medical history of Essential hypertension, benign.  Past Surgical History:  has no past surgical history on file.           Assessment   Performance deficits / Impairments: Decreased balance;Decreased functional mobility ;Decreased high-level IADLs;Decreased strength;Decreased coordination;Decreased ADL status  Assessment: pt from home with Father, normally independent with IADL's, functional mobility without AD, driving; admitted for Left side numbness & tingling UE & LE; pt now requiring, CGA/min assist with bathroom mobility, standing  ADL's; pt to benefit from skilled OT services;  REQUIRES OT FOLLOW-UP: Yes  Activity Tolerance  Activity Tolerance: Patient Tolerated treatment well  Activity Tolerance Comments: sitting in chair after bathroom mobility on RA: BP = 147/95, HR = 97, 98 % O 2 sats;        Plan   Occupational Therapy Plan  Times Per Week: 3-5x week  Current Treatment Recommendations: Strengthening, Balance training, Functional mobility training, Neuromuscular re-education, 
Occupational Therapy  Facility/Department: [facility] C5 - MED SURG/ORTHO  Daily Treatment Note  NAME: Irwin Dillon  : 1965  MRN: [id number]    Date of Service: 3/19/2024    Discharge Recommendations:  IP Rehab  OT Equipment Recommendations  Equipment Needed: No    Therapy discharge recommendations are subject to collaboration from the patient’s interdisciplinary healthcare team, including MD and case management recommendations.    Barriers to Home Discharge:   [x] Steps to access home entry or bed/bath:   [x] Unable to transfer, ambulate, or propel wheelchair household distances without assist   [x] Limited available assist at home upon discharge    [x] Patient or family requests d/c to post-acute facility    [] Poor cognition/safety awareness for d/c to home alone    [] Unable to maintain ordered weight bearing status    [] Patient with salient signs of long-standing immobility   [] Decreased independence with ADLs   [x] Increased risk for falls   [] Other:    If pt is unable to be seen after this session, please let this note serve as discharge summary.  Please see case management note for discharge disposition.  Thank you.    Patient Diagnosis(es): There were no encounter diagnoses.     Assessment    Assessment: Pt tolerated session fair, however declined OOB mobility follow PT session earlier. Performed 9-hold peg test which demonstrated decreased fine motor coordination in L hand (see results). Pt performed bed mobility w/ SBA and seated grooming tasks w/ SPV and set up. Tested sensation to light touch on pt's LUE/LLE, found to have slightly decreased sensation from dermatome T1-S1. Pt also reported new blurred vision for distance but not close up, no other visual deficits reported. Pt is limited by weakness, fatigue, balance, sensation, pain, endurance, and activity tolerance and will benefit from continued acute OT. Recommend IPR upon d/c to address functional deficits and increase safety and IND. Pt 
Patient refused lovenox this shift. Patient states that he's only supposed to take it once a day. Patient education given but patient continued to refuse.   
Patient: Irwin Dillon  [id number]  Date: 3/21/2024      Chief Complaint: left sided weakness and tingling    History of Present Illness/Hospital Course:  Irwin Dillon is a 58 yrs old male with a PMHx of atrial fibrillation s/p cardioversion 15 years ago and HTN who presented to ED with tingling down left side of body, blurry vision. Patient originally presented to [facility] ED on 3/15/24 after experiencing L sided paresthesias and numbness, ED found patient dysarthric and with chest pain as well. CT, CTA negative for acute findings, and patient was transferred to [facility] for admission. Neurology was consulted. MRI brain showed \"Focal subcortical FLAIR signal abnormality within the dorsal left temporal lobe with suspected cortical thickening. Additional subtle area of T2/FLAIR hyperintensity extending along the left hippocampus.\" MRI brain with contrast showed small area of focal subcortical enhancement within the posterior left inferior temporal gyrus. MRA was normal. MRV was negative for venous sinus thrombosis. He is suspected to have a subacute ischemic stroke or cortical vein thrombosis. He is to have a follow up MRI brain in 1 month. He continues to have functional deficits below his baseline.     Interval History:  No acute events overnight. Today Irwin is seen without family present. He reports that he is feeling well. He is hopeful to be approved for ARU.      has a past medical history of Essential hypertension, benign.     has no past surgical history on file.     reports that he has never smoked. He has never used smokeless tobacco. He reports that he does not drink alcohol.    family history includes High Blood Pressure in his sister.      REVIEW OF SYSTEMS:   Denies f/c, n/v, cp, sob    Physical Examination:  Vitals: Patient Vitals for the past 24 hrs:   BP Temp Temp src Pulse Resp SpO2   03/21/24 1336 (!) 144/75 98.4 °F (36.9 °C) Oral 77 17 96 %   03/21/24 0858 134/81 98 °F (36.7 
Patient: Irwin Dillon  [id number]  Date: 3/20/2024      Chief Complaint: left sided weakness and tingling    History of Present Illness/Hospital Course:  Irwin Dillon is a 58 yrs old male with a PMHx of atrial fibrillation s/p cardioversion 15 years ago and HTN who presented to ED with tingling down left side of body, blurry vision. Patient originally presented to [facility] ED on 3/15/24 after experiencing L sided paresthesias and numbness, ED found patient dysarthric and with chest pain as well. CT, CTA negative for acute findings, and patient was transferred to [facility] for admission. Neurology was consulted. MRI brain showed \"Focal subcortical FLAIR signal abnormality within the dorsal left temporal lobe with suspected cortical thickening. Additional subtle area of T2/FLAIR hyperintensity extending along the left hippocampus.\" MRI brain with contrast showed small area of focal subcortical enhancement within the posterior left inferior temporal gyrus. MRA was normal. MRV was negative for venous sinus thrombosis. He is suspected to have a subacute ischemic stroke or cortical vein thrombosis. He is to have a follow up MRI brain in 1 month. He continues to have functional deficits below his baseline.     Interval History:  No acute events overnight. Today Irwin is seen in his room with family present. He reports that his left leg is heavy. He remains motivated to work with therapies and is interested in ARU.      has a past medical history of Essential hypertension, benign.     has no past surgical history on file.     reports that he has never smoked. He has never used smokeless tobacco. He reports that he does not drink alcohol.    family history includes High Blood Pressure in his sister.      REVIEW OF SYSTEMS:   Denies f/c, n/v, cp, sob    Physical Examination:  Vitals: Patient Vitals for the past 24 hrs:   BP Temp Temp src Pulse Resp SpO2   03/20/24 1130 (!) 143/81 98.1 °F (36.7 °C) Oral 
Patient: Irwin Dillon  [id number]  Date: 3/22/2024      Chief Complaint: left sided weakness and tingling    History of Present Illness/Hospital Course:  Irwin Dillon is a 58 yrs old male with a PMHx of atrial fibrillation s/p cardioversion 15 years ago and HTN who presented to ED with tingling down left side of body, blurry vision. Patient originally presented to [facility] ED on 3/15/24 after experiencing L sided paresthesias and numbness, ED found patient dysarthric and with chest pain as well. CT, CTA negative for acute findings, and patient was transferred to [facility] for admission. Neurology was consulted. MRI brain showed \"Focal subcortical FLAIR signal abnormality within the dorsal left temporal lobe with suspected cortical thickening. Additional subtle area of T2/FLAIR hyperintensity extending along the left hippocampus.\" MRI brain with contrast showed small area of focal subcortical enhancement within the posterior left inferior temporal gyrus. MRA was normal. MRV was negative for venous sinus thrombosis. He is suspected to have a subacute ischemic stroke or cortical vein thrombosis. He is to have a follow up MRI brain in 1 month. He continues to have functional deficits below his baseline.     Interval History:  No acute events overnight. Today Irwin is seen with family present. He reports bilateral foot and ankle pain. He remains interested in ARU.      has a past medical history of Essential hypertension, benign.     has no past surgical history on file.     reports that he has never smoked. He has never used smokeless tobacco. He reports that he does not drink alcohol.    family history includes High Blood Pressure in his sister.      REVIEW OF SYSTEMS:   Denies f/c, n/v, cp, sob    Physical Examination:  Vitals: Patient Vitals for the past 24 hrs:   BP Temp Temp src Pulse Resp SpO2 Height   03/22/24 1511 (!) 148/87 -- -- 75 -- 97 % --   03/22/24 1334 -- -- -- -- -- -- 1.778 m (5' 
Patient: Irwin Dillon  [id number]  Date: 3/19/2024      Chief Complaint: left sided weakness and tingling    History of Present Illness/Hospital Course:  Irwin Dillon is a 58 yrs old male with a PMHx of atrial fibrillation s/p cardioversion 15 years ago and HTN who presented to ED with tingling down left side of body, blurry vision. Patient originally presented to [facility] ED on 3/15/24 after experiencing L sided paresthesias and numbness, ED found patient dysarthric and with chest pain as well. CT, CTA negative for acute findings, and patient was transferred to [facility] for admission. Neurology was consulted. MRI brain showed \"Focal subcortical FLAIR signal abnormality within the dorsal left temporal lobe with suspected cortical thickening. Additional subtle area of T2/FLAIR hyperintensity extending along the left hippocampus.\" MRI brain with contrast showed small area of focal subcortical enhancement within the posterior left inferior temporal gyrus. MRA was normal. MRV was negative for venous sinus thrombosis. He is suspected to have a subacute ischemic stroke or cortical vein thrombosis. He is to have a follow up MRI brain in 1 month. He continues to have functional deficits below his baseline. Today Irwin reports continued left sided weakness. He also reports pain in his left ankle. He is highly motivated to work with therapies and remains interested in ARU.      has a past medical history of Essential hypertension, benign.     has no past surgical history on file.     reports that he has never smoked. He has never used smokeless tobacco. He reports that he does not drink alcohol.    family history includes High Blood Pressure in his sister.      REVIEW OF SYSTEMS:   CONSTITUTIONAL: negative for fevers, chills, diaphoresis, activity change, appetite change, fatigue, night sweats and unexpected weight change.   EYES: negative for blurred vision, eye discharge, visual disturbance and 
Physical Therapy  Facility/Department: [facility] C5 - MED SURG/ORTHO  Daily Treatment Note  NAME: Irwin Dillon  : 1965  MRN: [id number]    Date of Service: 3/25/2024    Discharge Recommendations:  IP Rehab   PT Equipment Recommendations  Equipment Needed: No  Therapy discharge recommendations are subject to collaboration from the patient’s interdisciplinary healthcare team, including MD and case management recommendations.     Barriers to Home Discharge:   [x] Steps to access home entry or bed/bath: 4 REED    [x] Unable to transfer, ambulate, or propel wheelchair household distances without assist   [] Limited available assist at home upon discharge    [x] Patient or family requests d/c to post-acute facility    [] Poor cognition/safety awareness for d/c to home alone    [] Unable to maintain ordered weight bearing status    [] Patient with salient signs of long-standing immobility   [x] Decreased independence with ADLs   [x] Increased risk for falls   [] Other:  Patient Diagnosis(es): There were no encounter diagnoses.    Assessment   Assessment: Pt was able to complete bed mobility with supervision, transfers with SBA, and ambulate ~75ft with rw and CGA. Gait demos decreased safety and slow gait speed compared to baseline. REquires use of RW for balance and demos increased use of BUE on walker in order to decreased WB through LE to decrease pain, cues required for sequencing initially with RW and for upright posture. Pt With good pariticipation in seated exercises although he does need increased time and rest breaks PRN for L LE due to fatigue and weakness. Pt will continue to benefit from skilled PT services to address current deficits. Continue to rec IPR at DC when deemed medically appropriate, pt demos ability to tolerate 3hrs of therapy a day and is motivated to participate in therapy.  Activity Tolerance: Patient tolerated treatment well;Patient limited by pain  Equipment Needed: No     Plan  
Physical Therapy  Facility/Department: [facility] C5 - MED SURG/ORTHO  Daily Treatment Note  NAME: Irwin Dillon  : 1965  MRN: [id number]    Date of Service: 3/26/2024    Discharge Recommendations:  24 hour supervision or assist, Home with Home health PT   PT Equipment Recommendations  Equipment Needed: No    Patient Diagnosis(es): There were no encounter diagnoses.    Assessment   Assessment: Pt was able to complete bed mobility with mod indep, transfers with SBA, and ambulate ~40ft with rw and SBA. Gait demos decreased safety and slow gait speed compared to baseline. Pt negotiates stair steps sba using Bilat HRs. Pt will continue to benefit from skilled PT services to address current deficits. Pr recommending / assist at home  (his father's home) andf HHPT.  Activity Tolerance: Patient tolerated treatment well;Patient limited by pain     Plan    Physical Therapy Plan  General Plan: 3-5 times per week  Specific Instructions for Next Treatment: progress gait training and LLE strength training  Current Treatment Recommendations: Strengthening;Balance training;Functional mobility training;Transfer training;Gait training;Stair training;Neuromuscular re-education;Home exercise program;Patient/Caregiver education & training;Equipment evaluation, education, & procurement;Therapeutic activities     Restrictions  Restrictions/Precautions  Restrictions/Precautions: Up as Tolerated, Fall Risk, General Precautions  Position Activity Restriction  Other position/activity restrictions: tele, Hearing impaired     Subjective    Subjective  Subjective: Pt in bed upon arrival, RN cleared for therapy. pt's father present.  Pain: No pain at rest; reports continued disturbed sensations in B feet.  Orientation  Overall Orientation Status: Within Normal Limits     Objective   Vitals  Vitals:    24 1444   BP: (!) 146/83   Pulse: 68   Resp: 20   Temp: 98.3 °F (36.8 °C)   SpO2: 96%      Bed Mobility Training  Bed Mobility 
Physical Therapy  Facility/Department: [facility] C5 - MED SURG/ORTHO  Daily Treatment Note  NAME: Irwin Dillon  : 1965  MRN: [id number]    Date of Service: 3/19/2024    Discharge Recommendations:  IP Rehab   PT Equipment Recommendations  Equipment Needed: No    Patient Diagnosis(es): There were no encounter diagnoses.    Assessment   Assessment: Pt con't to report \"tickles\" entire L side, pain L foot and \"pinching\" R foot.  Pt able to sit to EOB SBA/Sup and stand and amb with walker X 40' CGA/SBA but was unable to place full weight on L foot due to pain v. Strength.  Pt is recommended for con't skilled PT and IPR at D/C.    Activity Tolerance: Patient tolerated evaluation without incident;Patient limited by fatigue  Equipment Needed: No     Plan    Physical Therapy Plan  General Plan: 3-5 times per week  Specific Instructions for Next Treatment: progress gait training and LLE strength training  Current Treatment Recommendations: Strengthening;Balance training;Functional mobility training;Transfer training;Gait training;Stair training;Neuromuscular re-education;Home exercise program;Patient/Caregiver education & training;Equipment evaluation, education, & procurement;Therapeutic activities     Restrictions  Restrictions/Precautions  Restrictions/Precautions: Up as Tolerated, Fall Risk, General Precautions  Position Activity Restriction  Other position/activity restrictions: tele, Hearing impaired     Subjective    Subjective  Subjective: Agreeable, in bed,  Recites story of how he hurt his ankle at work and then days after returning to work came in with high BP and stroke- like symptoms  Pain: L foot/ankle pain severe dorsal surface lateral aspect , R foot \"pinching\" plantar surface near 3 rd digit; \"tickles\" entire L side  Orientation  Overall Orientation Status: Within Functional Limits  Orientation Level: Oriented X4  Cognition  Overall Cognitive Status: Exceptions  Arousal/Alertness: Delayed responses to 
Physical Therapy/Occupational Therapy     1st attempt: Pt getting EEG this morning, will attempt at a later time/date.     2nd attempt: attempted to see patient this afternoon. Pt sates he is getting ready to get in the shower, requesting PT to come back tomorrow. Will follow up at a later date as pt is appropriate. Thank you    Marianna Ulloa PT, DPT   Nadege Krishnan, OTR/L  
TODAY'S DATE:  3/16/2024      Current NIHSS 2      Discussed personal risk factors for Stroke/TIA with patient/family, and ways to reduce the risk for a recurrent stroke.     Patient's personal risk factors which were identified are:   []   Alcohol Abuse: check with your physician before any alcohol consumption.   [x]   Atrial fibrillation: may cause blood clots  []   Drug Abuse: Seek help, talk with your doctor  []   Clotting Disorder  []   Diabetes  []   Family history of stroke or heart disease  [x]   High Blood Pressure/Hypertension: work with your physician  []   High cholesterol: monitor cholesterol levels with your physician  []   Overweight/Obesity: work with your physician for your ideal body weight  []   Physical Inactivity: get regular exercise as directed by your physician  []   Personal history of previous TIA or stroke  []   Poor Diet: decrease salt (sodium) in your diet, follow diet directed by physician  []   Smoking: stop smoking      Reviewed the Following Education with Patient and/or Family:   - Signs and Symptoms of Stroke  - Personal risk factors   - How to activate EMS (911)   - Importance of Follow Up Appointments at Discharge   - Importance of Compliance with Medications Prescribed at Discharge  - Available community resources and stroke advocacy groups if needed    Patient and/or family member: verbalized understanding.     Stroke Education booklet given to patient/family (or verified, if given already), which reviews above information. yes         Electronically signed by Rainer Harvey RN on 3/16/2024 at 8:48 PM       
TODAY'S DATE:  3/17/2024      Current NIHSS 3      Discussed personal risk factors for Stroke/TIA with patient/family, and ways to reduce the risk for a recurrent stroke.     Patient's personal risk factors which were identified are:   []   Alcohol Abuse: check with your physician before any alcohol consumption.   [x]   Atrial fibrillation: may cause blood clots  []   Drug Abuse: Seek help, talk with your doctor  []   Clotting Disorder  []   Diabetes  []   Family history of stroke or heart disease  [x]   High Blood Pressure/Hypertension: work with your physician  []   High cholesterol: monitor cholesterol levels with your physician  []   Overweight/Obesity: work with your physician for your ideal body weight  []   Physical Inactivity: get regular exercise as directed by your physician  []   Personal history of previous TIA or stroke  []   Poor Diet: decrease salt (sodium) in your diet, follow diet directed by physician  []   Smoking: stop smoking      Reviewed the Following Education with Patient and/or Family:   - Signs and Symptoms of Stroke  - Personal risk factors   - How to activate EMS (911)   - Importance of Follow Up Appointments at Discharge   - Importance of Compliance with Medications Prescribed at Discharge  - Available community resources and stroke advocacy groups if needed    Patient and/or family member: verbalized understanding.     Stroke Education booklet given to patient/family (or verified, if given already), which reviews above information. yes         Electronically signed by Ester Howe RN on 3/17/2024 at 10:38 AM        
TODAY'S DATE:  3/18/2024      Current NIHSS 3      Discussed personal risk factors for Stroke/TIA with patient/family, and ways to reduce the risk for a recurrent stroke.     Patient's personal risk factors which were identified are:   []   Alcohol Abuse: check with your physician before any alcohol consumption.   [x]   Atrial fibrillation: may cause blood clots  []   Drug Abuse: Seek help, talk with your doctor  []   Clotting Disorder  []   Diabetes  []   Family history of stroke or heart disease  [x]   High Blood Pressure/Hypertension: work with your physician  []   High cholesterol: monitor cholesterol levels with your physician  []   Overweight/Obesity: work with your physician for your ideal body weight  []   Physical Inactivity: get regular exercise as directed by your physician  []   Personal history of previous TIA or stroke  []   Poor Diet: decrease salt (sodium) in your diet, follow diet directed by physician  []   Smoking: stop smoking      Reviewed the Following Education with Patient and/or Family:   - Signs and Symptoms of Stroke  - Personal risk factors   - How to activate EMS (911)   - Importance of Follow Up Appointments at Discharge   - Importance of Compliance with Medications Prescribed at Discharge  - Available community resources and stroke advocacy groups if needed    Patient and/or family member: verbalized understanding.     Stroke Education booklet given to patient/family (or verified, if given already), which reviews above information. yes         Electronically signed by Rainer Harvey RN on 3/18/2024 at 6:41 AM       
TODAY'S DATE:  3/22/2024      Current NIHSS 2      Discussed personal risk factors for Stroke/TIA with patient/family, and ways to reduce the risk for a recurrent stroke.     Patient's personal risk factors which were identified are:   []   Alcohol Abuse: check with your physician before any alcohol consumption.   []   Atrial fibrillation: may cause blood clots  []   Drug Abuse: Seek help, talk with your doctor  []   Clotting Disorder  []   Diabetes  []   Family history of stroke or heart disease  [x]   High Blood Pressure/Hypertension: work with your physician  []   High cholesterol: monitor cholesterol levels with your physician  [x]   Overweight/Obesity: work with your physician for your ideal body weight  []   Physical Inactivity: get regular exercise as directed by your physician  []   Personal history of previous TIA or stroke  []   Poor Diet: decrease salt (sodium) in your diet, follow diet directed by physician  []   Smoking: stop smoking      Reviewed the Following Education with Patient and/or Family:   - Signs and Symptoms of Stroke  - Personal risk factors   - How to activate EMS (911)   - Importance of Follow Up Appointments at Discharge   - Importance of Compliance with Medications Prescribed at Discharge  - Available community resources and stroke advocacy groups if needed    Patient and/or family member: verbalized understanding.     Stroke Education booklet given to patient/family (or verified, if given already), which reviews above information. yes         Electronically signed by Lara Edouard RN on 3/22/2024 at 6:53 PM       
TODAY'S DATE:  3/22/2024      Current NIHSS 2      Discussed personal risk factors for Stroke/TIA with patient/family, and ways to reduce the risk for a recurrent stroke.     Patient's personal risk factors which were identified are:   []   Alcohol Abuse: check with your physician before any alcohol consumption.   [x]   Atrial fibrillation: may cause blood clots  []   Drug Abuse: Seek help, talk with your doctor  []   Clotting Disorder  []   Diabetes  []   Family history of stroke or heart disease  [x]   High Blood Pressure/Hypertension: work with your physician  []   High cholesterol: monitor cholesterol levels with your physician  []   Overweight/Obesity: work with your physician for your ideal body weight  []   Physical Inactivity: get regular exercise as directed by your physician  []   Personal history of previous TIA or stroke  []   Poor Diet: decrease salt (sodium) in your diet, follow diet directed by physician  []   Smoking: stop smoking      Reviewed the Following Education with Patient and/or Family:   - Signs and Symptoms of Stroke  - Personal risk factors   - How to activate EMS (911)   - Importance of Follow Up Appointments at Discharge   - Importance of Compliance with Medications Prescribed at Discharge  - Available community resources and stroke advocacy groups if needed    Patient and/or family member: verbalized understanding.     Stroke Education booklet given to patient/family (or verified, if given already), which reviews above information. yes         Electronically signed by Mitali Pantoja RN on 3/22/2024 at 1:06 AM       
TODAY'S DATE:  3/23/2024      Current NIHSS 2      Discussed personal risk factors for Stroke/TIA with patient/family, and ways to reduce the risk for a recurrent stroke.     Patient's personal risk factors which were identified are:   []   Alcohol Abuse: check with your physician before any alcohol consumption.   []   Atrial fibrillation: may cause blood clots  []   Drug Abuse: Seek help, talk with your doctor  []   Clotting Disorder  []   Diabetes  []   Family history of stroke or heart disease  [x]   High Blood Pressure/Hypertension: work with your physician  []   High cholesterol: monitor cholesterol levels with your physician  [x]   Overweight/Obesity: work with your physician for your ideal body weight  []   Physical Inactivity: get regular exercise as directed by your physician  []   Personal history of previous TIA or stroke  []   Poor Diet: decrease salt (sodium) in your diet, follow diet directed by physician  []   Smoking: stop smoking      Reviewed the Following Education with Patient and/or Family:   - Signs and Symptoms of Stroke  - Personal risk factors   - How to activate EMS (911)   - Importance of Follow Up Appointments at Discharge   - Importance of Compliance with Medications Prescribed at Discharge  - Available community resources and stroke advocacy groups if needed    Patient and/or family member: demonstrates understanding.     Stroke Education booklet given to patient/family (or verified, if given already), which reviews above information. yes         Electronically signed by Mitali Pantoja RN on 3/23/2024 at 1:01 AM       
TODAY'S DATE:  3/23/2024      Current NIHSS 2      Discussed personal risk factors for Stroke/TIA with patient/family, and ways to reduce the risk for a recurrent stroke.     Patient's personal risk factors which were identified are:   []   Alcohol Abuse: check with your physician before any alcohol consumption.   []   Atrial fibrillation: may cause blood clots  []   Drug Abuse: Seek help, talk with your doctor  []   Clotting Disorder  []   Diabetes  []   Family history of stroke or heart disease  [x]   High Blood Pressure/Hypertension: work with your physician  []   High cholesterol: monitor cholesterol levels with your physician  [x]   Overweight/Obesity: work with your physician for your ideal body weight  []   Physical Inactivity: get regular exercise as directed by your physician  []   Personal history of previous TIA or stroke  []   Poor Diet: decrease salt (sodium) in your diet, follow diet directed by physician  []   Smoking: stop smoking      Reviewed the Following Education with Patient and/or Family:   - Signs and Symptoms of Stroke  - Personal risk factors   - How to activate EMS (911)   - Importance of Follow Up Appointments at Discharge   - Importance of Compliance with Medications Prescribed at Discharge  - Available community resources and stroke advocacy groups if needed    Patient and/or family member: verbalized understanding.     Stroke Education booklet given to patient/family (or verified, if given already), which reviews above information. yes         Electronically signed by Lara Edouard RN on 3/23/2024 at 12:41 PM       
98.3 °F (36.8 °C) (Oral)   Resp 16   Ht 1.778 m (5' 10\")   Wt 104.4 kg (230 lb 2.6 oz)   SpO2 96%   BMI 33.02 kg/m²     Diet: ADULT DIET; Regular  DVT Prophylaxis: []PPx LMWH  []SQ Heparin  [x]IPC/SCDs  []Eliquis  []Xarelto  []Coumadin  []Other -      Code status: Full Code  PT/OT Eval Status:   []NOT yet ordered  [x]Ordered and Pending   []Seen with Recommendations for:  []Home independently  []Home w/ assist  []HHC  []SNF  [x]Acute Rehab    Anticipated Discharge Day/Date:  1-2 days    Anticipated Discharge Location: []Home  []HHC  []SNF  [x]Acute Rehab  []ECF  []LTAC  []Hospice  []Other -      Consults:      IP CONSULT TO NEUROLOGY  IP CONSULT TO SOCIAL WORK  IP CONSULT TO PHYSICAL MEDICINE REHAB      This patient has a high likelihood of being discharged tomorrow and is appropriate for A1 Discharge Unit in AM pending clinical course overnight: []Yes  [x]No    ------------------------------------------------------------------------------------------------------------------------------------------------------------------------    MDM      [x] High (any 2)    A. Problems (any 1)  [x] Acute/Chronic Illness/injury posing threat to life or bodily function:    [] Severe exacerbation of chronic illness:    ---------------------------------------------------------------------  B. Risk of Treatment (any 1)   [] Drugs/treatments that require intensive monitoring for toxicity include:    [] Change in code status:    [] Decision to escalate care:    [] Major surgery/procedure with associated risk factors:    ----------------------------------------------------------------------  C. Data (any 2)  [x] Discussed current management and discharge planning options with Case Management.  [] Discussed management of the case with:    [] Telemetry personally reviewed and interpreted as documented above    [] Imaging personally reviewed and interpreted, includes:    [x] Data Review (any 3)  [] Collateral history obtained from:  
Safety education & training     Restrictions  Restrictions/Precautions  Restrictions/Precautions: Up as Tolerated, Fall Risk, General Precautions  Position Activity Restriction  Other position/activity restrictions: tele, Hearing impaired    Subjective   General  Chart Reviewed: Yes  Patient assessed for rehabilitation services?: Yes  Family / Caregiver Present: Yes (father)  Referring Practitioner: Freedom Jefferson MD  Diagnosis: Left side numbness & tingling  General Comment  Comments: RN cleared pt for OT eval; pt sitting up in bed, pt & father agreeable to therapy     Social/Functional History  Social/Functional History  Lives With: Parent (Father)  Type of Home: Trailer (Pt rports he stays in an RV, sometimes at his Dad's, sometimes at his sister's)  Home Access: Stairs to enter with rails  Entrance Stairs - Number of Steps: 4  Entrance Stairs - Rails: Left  Bathroom Shower/Tub: Tub/Shower unit, Shower chair with back  Bathroom Toilet: Standard (vanity next to toilet)  Bathroom Equipment: Grab bars in shower  Home Equipment: Crutches  Has the patient had two or more falls in the past year or any fall with injury in the past year?: No  Receives Help From: Family  ADL Assistance: Independent  Homemaking Assistance: Independent  Ambulation Assistance:  (without AD)  Transfer Assistance: Independent  Active : Yes  Mode of Transportation: Truck  Type of Occupation: works at [employer] ([employer])  Leisure & Hobbies: spending time with dog and  girlfriend in [place], [place]       Objective   Pulse: 77  Heart Rate Source: Monitor  BP: (!) 154/78  BP Location: Left upper arm  BP Method: Automatic  Patient Position: Supine;Turns self  MAP (Calculated): 103  Respirations: 16  SpO2: 98 %  O2 Device: None (Room air)  Temp: 97.7 °F (36.5 °C)          Observation/Palpation  Posture: Good  Safety Devices  Type of Devices: All fall risk precautions in place;Call light within reach;Gait belt;Patient at risk for 
independence with ADLs   [x] Increased risk for falls   [] Other:    If pt is unable to be seen after this session, please let this note serve as discharge summary.  Please see case management note for discharge disposition.  Thank you.    Plan    Physical Therapy Plan  General Plan: 3-5 times per week  Current Treatment Recommendations: Strengthening;Balance training;Functional mobility training;Transfer training;Gait training;Stair training;Neuromuscular re-education;Home exercise program;Patient/Caregiver education & training;Equipment evaluation, education, & procurement;Therapeutic activities     Restrictions  Restrictions/Precautions  Restrictions/Precautions: Up as Tolerated, Fall Risk, General Precautions  Position Activity Restriction  Other position/activity restrictions: tele, Hearing impaired     Subjective    Subjective  Subjective: Pt in bed upon arrival, RN cleared for therapy. Pt pleasant and agreeable  Pain: Reports L foot pain 5/10 and R foot pain 8/10  Orientation  Overall Orientation Status: Within Functional Limits  Orientation Level: Oriented X4     Objective   Vitals  Pulse: 75  Heart Rate Source: Monitor  BP: (!) 148/87  BP Location: Right upper arm  BP Method: Automatic  Patient Position: Up in chair  MAP (Calculated): 107  SpO2: 97 %  O2 Device: None (Room air)  Bed Mobility Training  Bed Mobility Training: Yes  Supine to Sit: Supervision  Sit to Supine: Other (comment) (In chair at end of session)  Balance  Sitting: Intact  Standing: With support (With RW)  Standing - Static: Constant support;Good  Standing - Dynamic: Constant support;Fair  Transfer Training  Transfer Training: Yes  Overall Level of Assistance: Stand-by assistance;Additional time;Adaptive equipment  Interventions: Safety awareness training;Verbal cues (Initial cues for hand placement)  Sit to Stand: Stand-by assistance  Stand to Sit: Stand-by assistance  Gait Training  Gait Training: Yes  Gait  Gait Training: Yes  Overall 
Patient  Education Provided: Role of Therapy;Plan of Care;Transfer Training;ADL Adaptive Strategies  Education Provided Comments: safety  Education Method: Verbal  Barriers to Learning: Hearing  Education Outcome: Demonstrated understanding;Continued education needed    Goals  Short Term Goals  Time Frame for Short Term Goals: 1 week(3-23-24)- goals ongoing 3/19  Short Term Goal 1: SBA with bathroom mobility with LRAD(Least Restrictive Assistive Device) by 3-23-24  Short Term Goal 2: SBA standing ADL's for 5 minutes  Short Term Goal 3: independent with dressing by 3-22-24  Short Term Goal 4: supervision with LUE coordination/strengthening exercises to complete ADL's  Patient Goals   Patient goals : \"get stronger to go back to work\"    AM-PAC - ADL  AM-PAC Daily Activity - Inpatient   How much help is needed for putting on and taking off regular lower body clothing?: A Little  How much help is needed for bathing (which includes washing, rinsing, drying)?: A Little  How much help is needed for toileting (which includes using toilet, bedpan, or urinal)?: A Little  How much help is needed for putting on and taking off regular upper body clothing?: A Little  How much help is needed for taking care of personal grooming?: None  How much help for eating meals?: None  AM-PAC Inpatient Daily Activity Raw Score: 20  AM-PAC Inpatient ADL T-Scale Score : 42.03  ADL Inpatient CMS 0-100% Score: 38.32  ADL Inpatient CMS G-Code Modifier : CJ    Therapy Time   Individual Concurrent Group Co-treatment   Time In 0820         Time Out 0915         Minutes 55         Timed Code Treatment Minutes: 55 Minutes       Kenyatta Green OT     
discharge, this note serves as tx and discharge summary.     Total Treatment Time / Charges     Time in Time out Total Time / units   Cognitive Tx         Speech Tx      Dysphagia Tx 1435 1448 13 min / 1 unit     Signature:  Ping Weeks M.S. CCC-SLP  Speech-language pathologist  [id number]      
vision)  Hearing  Hearing: Exceptions to WFL  Hearing Exceptions: Left hearing aid (deaf in R ear)    Cognition   Orientation  Overall Orientation Status: Within Functional Limits  Orientation Level: Oriented X4  Cognition  Overall Cognitive Status: Exceptions  Arousal/Alertness: Delayed responses to stimuli (impaired hearing?)  Following Commands: Follows one step commands with increased time  Attention Span: Attends with cues to redirect  Memory: Appears intact  Safety Judgement: Good awareness of safety precautions  Problem Solving: Assistance required to generate solutions;Assistance required to implement solutions  Insights: Decreased awareness of deficits  Initiation: Requires cues for some  Sequencing: Requires cues for some     Objective   Pulse: 77  Heart Rate Source: Monitor  BP: (!) 154/78  BP Location: Left upper arm  BP Method: Automatic  Patient Position: Supine;Turns self  MAP (Calculated): 103  Respirations: 16  SpO2: 98 %  O2 Device: None (Room air)  Temp: 97.7 °F (36.5 °C)     Observation/Palpation  Posture: Good  Gross Assessment  AROM: Within functional limits  PROM: Within functional limits  Strength: Generally decreased, functional (LLE grossly 3/5 to 3+/5 throughout; RLE grossly 5/5 throughout)  Coordination: Grossly decreased, non-functional  Tone: Normal  Sensation: Impaired (Pt is able to localize throughout LUE and LLE but numb and tingling throughout)                 Bed Mobility Training  Bed Mobility Training: Yes  Overall Level of Assistance: Stand-by assistance;Additional time  Interventions: Safety awareness training;Verbal cues  Supine to Sit: Stand-by assistance (HOB elevated toward right)  Sit to Supine:  (Pt left sitting up in chair)  Scooting: Stand-by assistance;Additional time (forward at EOB)  Balance  Sitting: Intact  Standing: Impaired  Standing - Static: Occasional;Fair  Standing - Dynamic: Fair;Constant support (HHA)  Transfer Training  Transfer Training: Yes  Overall 
30 mg SubCUTAneous BID    sodium chloride flush  5-40 mL IntraVENous 2 times per day    atorvastatin  40 mg Oral Nightly    aspirin  81 mg Oral Daily    clopidogrel  75 mg Oral Daily    docusate sodium  100 mg Oral BID     PRN Meds: sodium chloride, oxyCODONE-acetaminophen, sodium chloride flush, sodium chloride, ondansetron **OR** ondansetron, polyethylene glycol, perflutren lipid microspheres, perflutren lipid microspheres     Labs:  Personally reviewed and interpreted for clinical significance.     No results for input(s): \"WBC\", \"HGB\", \"HCT\", \"PLT\" in the last 72 hours.    Recent Labs     03/18/24  0657      K 4.1      CO2 24   BUN 13   CREATININE 1.1   CALCIUM 9.1     No results for input(s): \"PROBNP\", \"TROPHS\" in the last 72 hours.    No results for input(s): \"LABA1C\" in the last 72 hours.  No results for input(s): \"AST\", \"ALT\", \"BILIDIR\", \"BILITOT\", \"ALKPHOS\" in the last 72 hours.    No results for input(s): \"INR\", \"LACTA\", \"TSH\" in the last 72 hours.      Urine Cultures: No results found for: \"LABURIN\"  Blood Cultures: No results found for: \"BC\"  No results found for: \"BLOODCULT2\"  Organism: No results found for: \"ORG\"      Yanelis Blum, APRN - CNP    
clopidogrel  75 mg Oral Daily    docusate sodium  100 mg Oral BID     PRN Meds: sodium chloride flush, sodium chloride, ondansetron **OR** ondansetron, polyethylene glycol, perflutren lipid microspheres, perflutren lipid microspheres     Labs:  Personally reviewed and interpreted for clinical significance.     Recent Labs     03/15/24  2022 03/17/24  0518   WBC 8.7 8.5   HGB 14.9 15.0   HCT 44.2 44.1    274     Recent Labs     03/15/24  2022 03/17/24  0518 03/18/24  0657   * 137 136   K 4.7 3.9 4.1   CL 98* 101 101   CO2 25 24 24   BUN 10 13 13   CREATININE 1.0 1.1 1.1   CALCIUM 8.8 9.3 9.1     Recent Labs     03/15/24  2022   TROPHS 10     No results for input(s): \"LABA1C\" in the last 72 hours.  Recent Labs     03/15/24  2022   AST 27   ALT 12   BILITOT 0.3   ALKPHOS 78     Recent Labs     03/15/24  2022   INR 0.99       Urine Cultures: No results found for: \"LABURIN\"  Blood Cultures: No results found for: \"BC\"  No results found for: \"BLOODCULT2\"  Organism: No results found for: \"ORG\"      Yanelis Blum, APRN - CNP

## 2024-03-26 NOTE — DISCHARGE SUMMARY
dictated noncontrast head CT report.     No flow limiting stenosis or large vessel occlusion detected within or neck.     CT HEAD WO CONTRAST    Result Date: 3/15/2024  EXAMINATION: CT OF THE HEAD WITHOUT CONTRAST  3/15/2024 8:30 pm TECHNIQUE: CT of the head was performed without the administration of intravenous contrast. Automated exposure control, iterative reconstruction, and/or weight based adjustment of the mA/kV was utilized to reduce the radiation dose to as low as reasonably achievable. COMPARISON: None. HISTORY: ORDERING SYSTEM PROVIDED HISTORY: Stroke Symptoms TECHNOLOGIST PROVIDED HISTORY: Has a \"code stroke\" or \"stroke alert\" been called?->Yes Reason for exam:->Stroke Symptoms Decision Support Exception - unselect if not a suspected or confirmed emergency medical condition->Emergency Medical Condition ([place]) Reason for Exam: code stroke FINDINGS: BRAIN/VENTRICLES: There is no acute intracranial hemorrhage, mass effect or midline shift.  No abnormal extra-axial fluid collection.  The gray-white differentiation is maintained without evidence of an acute infarct.  There is no evidence of hydrocephalus. ORBITS: The visualized portion of the orbits demonstrate no acute abnormality. SINUSES: The visualized paranasal sinuses and mastoid air cells demonstrate no acute abnormality. SOFT TISSUES/SKULL:  No acute abnormality of the visualized skull or soft tissues.     No acute intracranial abnormality. The findings were sent to the Radiology Results Communication Center at 8:45 pm on 3/15/2024 to be communicated to a licensed caregiver.  Findings were subsequently communicated to Dr. Ami Anderson on 03/15/2024 at 8:48 p.m.     XR CHEST PORTABLE    Result Date: 3/15/2024  EXAMINATION: ONE XRAY VIEW OF THE CHEST 3/15/2024 8:36 pm COMPARISON: 01/23/2013. HISTORY: ORDERING SYSTEM PROVIDED HISTORY: stroke symptoms TECHNOLOGIST PROVIDED HISTORY: Reason for exam:->stroke symptoms Reason for Exam: stroke symptoms

## 2024-03-26 NOTE — CARE COORDINATION
Writer placed call to [place] [phone] line w/no answer and VM left for call back to complete P2P. Electronically signed by AUSTIN MELLO RN on 3/26/2024 at 10:57 AM

## 2024-03-27 ENCOUNTER — TELEPHONE (OUTPATIENT)
Dept: INTERNAL MEDICINE CLINIC | Age: 59
End: 2024-03-27

## 2024-03-27 ENCOUNTER — CARE COORDINATION (OUTPATIENT)
Dept: CASE MANAGEMENT | Age: 59
End: 2024-03-27

## 2024-03-27 ENCOUNTER — TELEPHONE (OUTPATIENT)
Age: 59
End: 2024-03-27

## 2024-03-27 DIAGNOSIS — R93.0 ABNORMAL MRI OF THE HEAD: Primary | ICD-10-CM

## 2024-03-27 DIAGNOSIS — R29.90 STROKE-LIKE SYMPTOM: Primary | ICD-10-CM

## 2024-03-27 PROCEDURE — 1111F DSCHRG MED/CURRENT MED MERGE: CPT | Performed by: INTERNAL MEDICINE

## 2024-03-27 NOTE — TELEPHONE ENCOUNTER
----- Message from Latasha Pritchett MD sent at 3/27/2024 11:52 AM EDT -----  Contact: Astrid [phone]  Restart lisinopril   ----- Message -----  From: Tiffany Carter  Sent: 3/27/2024  11:40 AM EDT  To: Latasha Pritchett MD    Caller asking if pt should still be taking lisinopril (PRINIVIL;ZESTRIL) 40 MG tablet ? Pt was discharged from [facility] yesterday with papers stating to stop. Caller states pt has been taking this for years. Please advise

## 2024-03-27 NOTE — CARE COORDINATION
Care Transitions Initial Follow Up Call    Call within 2 business days of discharge: Yes    Patient Current Location:  Home: [address] [place] [address] [address]  [facility] [address]    Care Transition Nurse contacted the family by telephone to perform post hospital discharge assessment. Verified name and  with family as identifiers. Provided introduction to self, and explanation of the Care Transition Nurse role.     Patient: Irwin Dillon Patient : 1965   MRN: [id number]  Reason for Admission: stroke-like symptom  Discharge Date: 3/26/24 RARS: Readmission Risk Score: 7.9      Last Discharge Facility       Date Complaint Diagnosis Description Type Department Provider    3/16/24   Admission (Discharged) Fabricio Neville MD            Was this an external facility discharge? No Discharge Facility:     Challenges to be reviewed by the provider   Additional needs identified to be addressed with provider: No  none           Method of communication with provider: none.    Incoming call from sister Astrid after patient received text message from CTN. Astrid confirmed that patient is hearing impaired and slurred speech. Sister stated that patient continues to have \"cognition issues\" and primary CG for patient. Patient typically lives alone, but went to stay with his father (84 yo) at discharge. Astrid text patient multiple times a day and scheduled to visit him tomorrow. Patient has AVS and taking all medications except gabapentin and nasal spray. Astrid is also concerned that patient's prescription, lisinopril was stopped. Stated he has been taking Lisinopril for a long time and unsure why that would be stopped. Patient has BP monitor and will monitor BP.  Notes reviewed and encouraged to reach out to PCP to discuss medication before resuming lisinopril. Stated understanding. Patient is independent with medications and taking as directed. PCP office called and scheduled f/u appt for next week and

## 2024-03-27 NOTE — TELEPHONE ENCOUNTER
----- Message from Jackie Her sent at 3/27/2024  1:56 PM EDT -----  Astrid states pt does monitor his BP at home but she does not know what the readings are and when asked if pt could call back with readings she said no as it is too much for him as he possibly had a stroke. States it was around 140s-150s while in hospital, when discharged he was informed not to take BP med and yesterday systolic was around 117. Please advise.  ----- Message -----  From: Latasha Pritchett MD  Sent: 3/27/2024   1:36 PM EDT  To: Jackie Her    Has he been checking he BP at home   ----- Message -----  From: Ruby Gardiner MA  Sent: 3/27/2024   1:33 PM EDT  To: Latasha Pritchett MD    Patient states the hospital took him off his blood pressure medication while in the hospital but never told him if he is supposed to start the medication back. Current Medications are in chart. He states BP readings are done every 2 hours and the last reading was 117/78 without medication. Patient states he is using a fold up walker due to being weak and experiencing some swelling in the feet and ankles. He is concerned with the swelling and would like to know other than elevating if there is something else he should be doing. Please advise.     Verbal was given to speak to Astrid regarding this matter. Patient will update TAD at next appointment.   Astrid [phone]

## 2024-03-27 NOTE — CARE COORDINATION
Care Transitions Initial Follow Up Call    Call within 2 business days of discharge: Yes    Patient: Irwin Dillon Patient : 1965   MRN: [id number]  Reason for Admission: stroke like symptom  Discharge Date: 3/26/24 RARS: Readmission Risk Score: 7.9      Last Discharge Facility       Date Complaint Diagnosis Description Type Department Provider    3/16/24   Admission (Discharged) Fabricio Neville MD          Spoke to [facility] and referral was received, but has been unsuccessful in reaching patient. Confirmed patient's numbers and another attempt to be made  Attempted to reach patient via phone for initial post hospital transition call.  No option to leave message.  Text sent with request to call back and number provided.        Care Transitions 24 Hour Call    Care Transitions Interventions           Follow Up  Future Appointments   Date Time Provider Department Center   2024  3:40 PM Latasha Pritchett MD [facility] None   2024  1:20 PM Latasha Pritchett MD [facility] None     Alka Waggoner RN

## 2024-03-27 NOTE — TELEPHONE ENCOUNTER
Patient informed. Message sent to Dr. CALLES regarding medication changes, problems and verbal to speak to alee.

## 2024-03-27 NOTE — TELEPHONE ENCOUNTER
Pt sister, on HIPAA. called to schedule hospital fu (4-6 weeks) Per pt chart, pt needs to have fu MRI in 1-2 months.     Orders for MRI fu not in chart. Pt will cb to schedule fu once MRI is schedule.     Please advise when MRI orders are in chart.

## 2024-03-28 NOTE — TELEPHONE ENCOUNTER
Spoke with pt's sister - told her MRI order is placed & they can call central scheduling to make that appt.  Hosp f/u sched 4/29/24 @ 11:30 in [place].  Pt is deaf.  His sister, Astrid, will be coming with him to that appt and she will interpret during the visit.

## 2024-04-01 ENCOUNTER — TELEPHONE (OUTPATIENT)
Dept: INTERNAL MEDICINE CLINIC | Age: 59
End: 2024-04-01

## 2024-04-01 NOTE — TELEPHONE ENCOUNTER
----- Message from Latasha Pritchett MD sent at 4/1/2024  1:08 PM EDT -----  Contact: Seth [phone]  Yes   ----- Message -----  From: Tiffany Carter  Sent: 4/1/2024  12:34 PM EDT  To: Latasha Pritchett MD    Caller PT from [facility], asking if you will follow for PT for patient. Please advise

## 2024-04-03 ENCOUNTER — CARE COORDINATION (OUTPATIENT)
Dept: CASE MANAGEMENT | Age: 59
End: 2024-04-03

## 2024-04-03 ENCOUNTER — OFFICE VISIT (OUTPATIENT)
Dept: INTERNAL MEDICINE CLINIC | Age: 59
End: 2024-04-03

## 2024-04-03 VITALS
BODY MASS INDEX: 31.92 KG/M2 | WEIGHT: 223 LBS | DIASTOLIC BLOOD PRESSURE: 82 MMHG | HEIGHT: 70 IN | HEART RATE: 72 BPM | SYSTOLIC BLOOD PRESSURE: 134 MMHG

## 2024-04-03 DIAGNOSIS — R53.1 LEFT-SIDED WEAKNESS: Primary | ICD-10-CM

## 2024-04-03 DIAGNOSIS — Z09 HOSPITAL DISCHARGE FOLLOW-UP: ICD-10-CM

## 2024-04-03 PROCEDURE — 99495 TRANSJ CARE MGMT MOD F2F 14D: CPT | Performed by: INTERNAL MEDICINE

## 2024-04-03 PROCEDURE — 1111F DSCHRG MED/CURRENT MED MERGE: CPT | Performed by: INTERNAL MEDICINE

## 2024-04-03 RX ORDER — PENICILLIN V POTASSIUM 500 MG/1
500 TABLET ORAL 4 TIMES DAILY
Qty: 28 TABLET | Refills: 0 | Status: SHIPPED | OUTPATIENT
Start: 2024-04-03 | End: 2024-04-10

## 2024-04-03 NOTE — CARE COORDINATION
Care Transitions Follow Up Call    Patient Current Location:  Home: [address]  [facility] [address]    Care Transition Nurse contacted the family by telephone to follow up after admission.  Verified name and  with family as identifiers.    Patient: Irwin Dillon  Patient : 1965   MRN: [id number]  Reason for Admission: stroke-like symptom   Discharge Date: 3/26/24 RARS: Readmission Risk Score: 7.9      Needs to be reviewed by the provider   Additional needs identified to be addressed with provider: No  none         Method of communication with provider: none.    Spoke to sisterAstrid- HIPAA verified in system. Patient is doing well and scheduled with PCP this afternoon. Astrid to take patient to that appt. Patient has been holding the lisinopril prescription as directed. Reviewed BP readings and running between 115-120/70s without medication. Patient has been having some BLE edema and MD office was notified. Patient is elevating thru the day, but Astrid is unsure if patient is sticking to his low salt diet.   Patient had in-home physical therapy session yesterday for evaluation. Frequency of visits is 2-3 times a week. Therapist is also going to request a  order to be added to services for possible meals on wheels for patient. CTN also educated Astrid that patient's [employer] Medicare may have a post-hospitalization meal benefit and encouraged to reach out to insurance. Stated understanding.   Astrid stated that patient has 4 daughters and going to reach out to her nieces to assist with helping support patient as well.   Denied any acute needs at present time.  Agreeable to f/u calls.  Educated on the use of urgent care or physician’s 24 hr access line if assistance is needed after hours.    Addressed changes since last contact:  none  Discussed follow-up appointments. If no appointment was previously scheduled, appointment scheduling offered: Yes.   Is follow up

## 2024-04-03 NOTE — PROGRESS NOTES
Post-Discharge Transitional Care  Follow Up      Irwin Dillon   YOB: 1965    Date of Office Visit:  4/3/2024  Date of Hospital Admission: 3/16/24  Date of Hospital Discharge: 3/26/24  Risk of hospital readmission (high >=14%. Medium >=10%) :Readmission Risk Score: 7.9      Care management risk score Rising risk (score 2-5) and Complex Care (Scores >=6): No Risk Score On File     Non face to face  following discharge, date last encounter closed (first attempt may have been earlier): 03/27/2024    Call initiated 2 business days of discharge: Yes    ASSESSMENT/PLAN:   Left-sided weakness      Medical Decision Making: moderate complexity  No follow-ups on file.           Admitted to [facility] with left sided weakness.  CT head,MRI brain was abnormal.EEG normal.  MRV head normal.  Advised repeat MRI in 1 month. Will follow up with Dr. Elena.    He continues to have weakness on the left side.      Subjective:   HPI:  Follow up of Hospital problems/diagnosis(es):    Diagnosis Orders   1. Left-sided weakness          Currently on ASA,plavix and statin    Hypertension  Bp is good without meds        Inpatient course: Discharge summary reviewed- see chart.    Interval history/Current status:     Patient Active Problem List   Diagnosis    Essential hypertension, benign    Stroke-like symptom    Left sided numbness    Hard of hearing    Dysarthria       Medications listed as ordered at the time of discharge from hospital     Medication List            Accurate as of April 3, 2024  3:12 PM. If you have any questions, ask your nurse or doctor.                CONTINUE taking these medications      aspirin 81 MG chewable tablet  Take 1 tablet by mouth daily     atorvastatin 40 MG tablet  Commonly known as: LIPITOR  Take 1 tablet by mouth nightly     clopidogrel 75 MG tablet  Commonly known as: PLAVIX  Take 1 tablet by mouth daily     docusate 100 MG Caps  Commonly known as: COLACE, DULCOLAX  Take 100 mg by mouth 2

## 2024-04-08 ENCOUNTER — TELEPHONE (OUTPATIENT)
Age: 59
End: 2024-04-08

## 2024-04-08 NOTE — TELEPHONE ENCOUNTER
Physical therapist Seth ([phone]) wanted to update Dr. Elena on progress/issues with pt therapy. Pt is experiencing pain in feet with Weight bearing, and numbess in bottom of feet. Pt is also having motor skills difficulty. Seth wanted to update as these symptoms seem to be worsening.   Pt is scheduled 4/ 25/24 for MRI and ov on 4/29/24.

## 2024-04-10 ENCOUNTER — CARE COORDINATION (OUTPATIENT)
Dept: CASE MANAGEMENT | Age: 59
End: 2024-04-10

## 2024-04-10 NOTE — CARE COORDINATION
Care Transitions Follow Up Call    Patient Current Location:  Home: [address]  [facility] [address]    Care Transition Nurse contacted the family by telephone to follow up after admission.  Verified name and  with family as identifiers.    Patient: Irwin Dillon  Patient : 1965   MRN: [id number]  Reason for Admission:  stroke-like symptom   Discharge Date: 3/26/24 RARS: Readmission Risk Score: 7.9      Needs to be reviewed by the provider   Additional needs identified to be addressed with provider: No  none         Method of communication with provider: none.    Spoke to sister, Astrid- HIPAA verified in system. Patient is [place] and request calls to be completed with sister. Patient continues to have swelling in BLE and sore/painful feet when walking. Astrid spoke to in-home physical therapist about patient being sore and unable to sleep d/t discomfort after his sessions. PT saw him on Monday and Astrid notified therapist. Therapist stated that patient had not mentioned the soreness/pain to him, but will lighten up sessions to prevent sessions.  Patient had to run errands and having a hard time when he goes out. Worsening cognitive issues since discharge and mentioned to [facility].  The therapist Seth messaged neurology group and tried to get patient seen sooner than scheduled. Message noted in system. Astrid has not heard back from office.  Patient was taken off lisinopril and seen by PCP for TCM visit. Patient was instructed to continue to hold until seen by Dr Ulices Resendiz is concerned because patient is getting elevated BP when patient gets stressed and patient gets stressed easily. Patient has also lost 27lbs since discharge from hospital. Weight was 222lbs this morning and checking daily. Physical therapist recommended BOOST w/ protein to help with calories. Astrid has gotten for patient to drink.   from home care agency came out last week- Astrid

## 2024-04-17 ENCOUNTER — CARE COORDINATION (OUTPATIENT)
Dept: CASE MANAGEMENT | Age: 59
End: 2024-04-17

## 2024-04-17 NOTE — CARE COORDINATION
Care Transitions Follow Up Call      Patient: Irwin Dillon  Patient : 1965   MRN: [id number]  Reason for Admission: stroke-like symptom   Discharge Date: 3/26/24 RARS: Readmission Risk Score: 7.9    Attempted to reach patient via phone for transition call.  VM left stating purpose of call along with my contact information requesting a return call.    Follow Up  Future Appointments   Date Time Provider Department Center   2024  2:00 PM [facility] MRI  2 [facility] MRI [facility]   2024 11:30 AM Myron Zaman MD AND NEURO Neurology -   2024  1:20 PM Latasha Pritchett MD [facility] None      Care Transitions Subsequent and Final Call    Subsequent and Final Calls  Care Transitions Interventions  Other Interventions:             Alka Waggoner RN

## 2024-04-19 ENCOUNTER — TELEPHONE (OUTPATIENT)
Dept: INTERNAL MEDICINE CLINIC | Age: 59
End: 2024-04-19

## 2024-04-19 RX ORDER — AMOXICILLIN AND CLAVULANATE POTASSIUM 875; 125 MG/1; MG/1
1 TABLET, FILM COATED ORAL 2 TIMES DAILY
Qty: 14 TABLET | Refills: 0 | Status: SHIPPED | OUTPATIENT
Start: 2024-04-19 | End: 2024-04-26

## 2024-04-19 NOTE — TELEPHONE ENCOUNTER
----- Message from Niurka Gallo MA sent at 4/19/2024  3:27 PM EDT -----  Contact: [phone]  Patients sister called and states that he has an infected tooth and it has a puss pocket. Last time this happened, Dr CALLES gave him an antibiotic. He is hoping to have another called in to hold him over until he can get in to see his neurologist on the 29th.He is wanting the antibiotic to help with the infection. Please advise.     [facility]

## 2024-04-22 ENCOUNTER — CARE COORDINATION (OUTPATIENT)
Dept: CASE MANAGEMENT | Age: 59
End: 2024-04-22

## 2024-04-22 NOTE — CARE COORDINATION
Care Transitions Follow Up Call    Patient Current Location:  Home: [address] [place] [address] [address]  [facility] [address]    Care Transition Nurse contacted the family by telephone to follow up after admission.  Verified name and  with family as identifiers.    Patient: Irwin Dillon  Patient : 1965   MRN: [id number]  Reason for Admission: stroke-like symptom   Discharge Date: 3/26/24 RARS: Readmission Risk Score: 7.9      Needs to be reviewed by the provider   Additional needs identified to be addressed with provider: No  none         Method of communication with provider: none.    Spoke to sister, Astrid- HIPAA verified in system. Patient is [place] and request calls to be completed with sister. Sister stated that patient continues to be about the same with swelling, sore feet and ankles. Toes are numb per sister. Patient is using a walker d/t unsteadiness when walking and bilat feet pain. Patient has not been seen by therapist or nurse this week d/t insurance has not approved any further visits. Astrid is unsure of why visits have stopped when patient continues to have the need. Astrid reaching out to home care for update.  Patient is scheduled for MRI later this week and f/u with neurology on Monday of next week. Astrid to take patient to appt.  Denied any acute needs at present time.  Agreeable to f/u calls.  Educated on the use of urgent care or physician’s 24 hr access line if assistance is needed after hours.      Addressed changes since last contact:  none  Discussed follow-up appointments. If no appointment was previously scheduled, appointment scheduling offered: Yes.   Is follow up appointment scheduled within 7 days of discharge? No.    Follow Up  Future Appointments   Date Time Provider Department Center   2024  2:00 PM A MRI RM 2 [facility] MRI [facility]   2024 11:30 AM Myron Zaman MD AND NEURO Neurology -   2024  1:20 PM Latasha Pritchett MD [place]

## 2024-04-24 ENCOUNTER — TELEPHONE (OUTPATIENT)
Dept: INTERNAL MEDICINE CLINIC | Age: 59
End: 2024-04-24

## 2024-04-24 RX ORDER — CLOPIDOGREL BISULFATE 75 MG/1
75 TABLET ORAL DAILY
Qty: 90 TABLET | Refills: 0 | Status: SHIPPED | OUTPATIENT
Start: 2024-04-24

## 2024-04-24 NOTE — TELEPHONE ENCOUNTER
----- Message from Latasha Pritchett MD sent at 4/24/2024  7:52 AM EDT -----  Contact: [phone]  75 mg daily # 90  ----- Message -----  From: Vincent Weaver MD  Sent: 4/23/2024   1:38 PM EDT  To: Latasha Pritchett MD      ----- Message -----  From: Niurka Gallo MA  Sent: 4/23/2024  12:45 PM EDT  To: MD Dr MARLI Mcdaniel patient, he was placed on clopidogrel (PLAVIX) 75 MG table while admitted to the hospital recently. He will be on this medication due to having had a stroke. He is in need of a refill and only has 2 days of medication left. They would like to have that sent in today so he does not run out. Please advise.       [facility]

## 2024-04-25 ENCOUNTER — HOSPITAL ENCOUNTER (OUTPATIENT)
Dept: MRI IMAGING | Age: 59
Discharge: HOME | End: 2024-04-25
Attending: PSYCHIATRY & NEUROLOGY
Payer: MEDICARE

## 2024-04-25 DIAGNOSIS — R93.0 ABNORMAL MRI OF THE HEAD: ICD-10-CM

## 2024-04-25 PROCEDURE — 70553 MRI BRAIN STEM W/O & W/DYE: CPT

## 2024-04-25 PROCEDURE — A9579 GAD-BASE MR CONTRAST NOS,1ML: HCPCS | Performed by: PSYCHIATRY & NEUROLOGY

## 2024-04-25 PROCEDURE — 6360000004 HC RX CONTRAST MEDICATION: Performed by: PSYCHIATRY & NEUROLOGY

## 2024-04-25 RX ADMIN — GADOTERIDOL 20 ML: 279.3 INJECTION, SOLUTION INTRAVENOUS at 13:53

## 2024-04-29 ENCOUNTER — TELEPHONE (OUTPATIENT)
Dept: INTERNAL MEDICINE CLINIC | Age: 59
End: 2024-04-29

## 2024-04-29 ENCOUNTER — OFFICE VISIT (OUTPATIENT)
Dept: NEUROLOGY | Age: 59
End: 2024-04-29
Payer: MEDICARE

## 2024-04-29 ENCOUNTER — TELEPHONE (OUTPATIENT)
Dept: NEUROLOGY | Age: 59
End: 2024-04-29

## 2024-04-29 ENCOUNTER — TELEPHONE (OUTPATIENT)
Age: 59
End: 2024-04-29

## 2024-04-29 VITALS
SYSTOLIC BLOOD PRESSURE: 148 MMHG | HEART RATE: 68 BPM | OXYGEN SATURATION: 96 % | RESPIRATION RATE: 13 BRPM | DIASTOLIC BLOOD PRESSURE: 90 MMHG | HEIGHT: 70 IN | WEIGHT: 223 LBS | BODY MASS INDEX: 31.92 KG/M2

## 2024-04-29 DIAGNOSIS — M79.662 PAIN IN LEFT LOWER LEG: ICD-10-CM

## 2024-04-29 DIAGNOSIS — R93.0 ABNORMAL MRI OF THE HEAD: Primary | ICD-10-CM

## 2024-04-29 DIAGNOSIS — C71.9 GLIOMA (HCC): ICD-10-CM

## 2024-04-29 PROCEDURE — 99214 OFFICE O/P EST MOD 30 MIN: CPT | Performed by: PSYCHIATRY & NEUROLOGY

## 2024-04-29 PROCEDURE — 3080F DIAST BP >= 90 MM HG: CPT | Performed by: PSYCHIATRY & NEUROLOGY

## 2024-04-29 PROCEDURE — 3077F SYST BP >= 140 MM HG: CPT | Performed by: PSYCHIATRY & NEUROLOGY

## 2024-04-29 RX ORDER — LISINOPRIL 20 MG/1
20 TABLET ORAL DAILY
Qty: 30 TABLET | Refills: 2 | Status: SHIPPED | OUTPATIENT
Start: 2024-04-29

## 2024-04-29 NOTE — PATIENT INSTRUCTIONS
YOU MUST CONFIRM YOUR APPOINTMENT 1 DAY PRIOR OR IT WILL BE CANCELLED!!   Our office will call you 3 times the day prior to your appointment in an attempt to confirm.  Please return our call ASAP or confirm your appt through [employer] no later than 3 pm the day before your appointment.  If we do not hear back from you by 3 pm to confirm, your appointment will be cancelled & someone will be added into that slot from our wait list.           [facility]  [address] [facility]  2nd Floor

## 2024-04-29 NOTE — PROGRESS NOTES
Neurology outpatient follow-up visit    Patient name: Irwin Dillon      Chief Complaint:  Abnormal MRI brain.    History of present illness:  58 years old right-handed gentleman presented to the emergency room at the [facility] with acute left-sided arm and leg numbness and weakness and blurring of vision.  There was a possible complaint of substernal chest pain and elevated blood pressure according to 1 family member.  There was no headache or palpitation or dizziness or vertigo and no involvement of his head or face but the numbness seem to affect his left side of trunk and chest wall.     His initial complaint was mostly left-sided paresthesia and numbness heaviness  Of the left side and difficulty with ambulation seem to have developed later  The patient was evaluated by  teleneurology with a low NIH stroke scale score of 1 and was felt to be not a candidate for thrombolysis due to minimal symptoms at presentation his initial head CT and CTA were negative and his blood pressure  Which was initially elevated in the ED at 155/100 normalized while in the emergency room     Patient was started on dual antiplatelet therapy with a bolus of Plavix 300 mg and aspirin 324 mg and was admitted to the hospital for further workup and evaluation.  He was evaluated this morning by speech therapy And no restrictions were recommended     Brain MRI scan is pending     This morning the patient is seated at bedside in no acute distress having his breakfast he continues to complain of tingling and paresthesia of the left side involving mostly left arm and leg left side of the trunk with heaviness sensation of left arm and leg and difficulty with his ambulation and and blurring of vision.  There is no new aphasia or speech difficulty he is deaf and able to read lips and was able to communicate well with me. There seem to be no dysarthria either I did speak with his sister over the phone during this evaluation and

## 2024-04-29 NOTE — TELEPHONE ENCOUNTER
----- Message from Latasha Pritchett MD sent at 4/29/2024 11:51 AM EDT -----  Contact: Seth [phone]  Ok   ----- Message -----  From: Niurka Gallo MA  Sent: 4/29/2024   9:55 AM EDT  To: MD Seth Shelby, Physical Therapist with [facility] is wanting orders to continue PT with this patient. He can take a verbal order.       [address] - P [phone] - F [phone]  [address] [place] [address]  Phone: [phone]  Fax: [phone]

## 2024-04-30 ENCOUNTER — CARE COORDINATION (OUTPATIENT)
Dept: CASE MANAGEMENT | Age: 59
End: 2024-04-30

## 2024-04-30 NOTE — CARE COORDINATION
Care Transitions Follow Up Call    Patient: Irwin Dillon  Patient : 1965   MRN: [id number]  Reason for Admission: stroke-like symptom   Discharge Date: 3/26/24 RARS: Readmission Risk Score: 7.9    Attempted to reach patient via phone for transition call.  VM left stating purpose of call along with my contact information requesting a return call.    Follow Up  Future Appointments   Date Time Provider Department Center   5/3/2024 11:20 AM Lucy Yeepz APRN - CNP [facility] None   2024  1:20 PM Latasha Pritchett MD [facility] None        Care Transitions Subsequent and Final Call    Subsequent and Final Calls  Care Transitions Interventions  Other Interventions:           Alka Waggoner RN

## 2024-05-02 NOTE — TELEPHONE ENCOUNTER
Spoke with [facility] whom confirmed they received referral and will be scheduling patient with a week. Will continue to follow

## 2024-05-03 ENCOUNTER — OFFICE VISIT (OUTPATIENT)
Dept: INTERNAL MEDICINE CLINIC | Age: 59
End: 2024-05-03

## 2024-05-03 ENCOUNTER — CARE COORDINATION (OUTPATIENT)
Dept: CASE MANAGEMENT | Age: 59
End: 2024-05-03

## 2024-05-03 VITALS
HEART RATE: 68 BPM | SYSTOLIC BLOOD PRESSURE: 144 MMHG | BODY MASS INDEX: 32.64 KG/M2 | WEIGHT: 228 LBS | DIASTOLIC BLOOD PRESSURE: 86 MMHG | RESPIRATION RATE: 14 BRPM | HEIGHT: 70 IN

## 2024-05-03 DIAGNOSIS — M79.89 SWELLING OF LEFT LOWER EXTREMITY: Primary | ICD-10-CM

## 2024-05-03 DIAGNOSIS — M25.572 PAIN IN JOINT OF LEFT FOOT: ICD-10-CM

## 2024-05-03 DIAGNOSIS — R93.0 ABNORMAL MRI OF HEAD: ICD-10-CM

## 2024-05-03 NOTE — PROGRESS NOTES
Chief Complaint:   Irwin Dillon is a 58 y.o. male who presents for   Chief Complaint   Patient presents with    Other       HPI    Patient presents to the office with c/o swelling to left foot, ankle, calf  Ongoing since March.  He did tweak his ankle.  X-rays were negative.  Went to see podiatrist.  When he walks on it, it swells.  He keeps his leg elevated and it goes down.   Admitted to [facility] with left sided weakness.   Tingling coming up his leg.  Had MRI, MRA, MRV. Has possible Glioma.  Saw Dr. Zaman  Hasn't had a biopsy.  Has an appointment pending for this.   Having headaches and blurry vision.       Review of Systems  Review of Systems  See HPI    Allergies  Patient has no known allergies.      Vitals  BP (!) 144/86 (Site: Right Upper Arm, Position: Sitting)   Pulse 68   Resp 14   Ht 1.778 m (5' 10\")   Wt 103.4 kg (228 lb)   BMI 32.71 kg/m²     Current Medications  Current Outpatient Medications   Medication Sig Dispense Refill    lisinopril (PRINIVIL;ZESTRIL) 20 MG tablet Take 1 tablet by mouth daily 30 tablet 2    aspirin 81 MG chewable tablet Take 1 tablet by mouth daily 30 tablet 3    atorvastatin (LIPITOR) 40 MG tablet Take 1 tablet by mouth nightly 30 tablet 3     No current facility-administered medications for this visit.       Past Medical History  Past Medical History:   Diagnosis Date    Essential hypertension, benign 11/21/06       Social History  Social History     Socioeconomic History    Marital status: Legally      Spouse name: Not on file    Number of children: Not on file    Years of education: Not on file    Highest education level: Not on file   Occupational History    Not on file   Tobacco Use    Smoking status: Never    Smokeless tobacco: Never   Vaping Use    Vaping Use: Never used   Substance and Sexual Activity    Alcohol use: No    Drug use: Never    Sexual activity: Not on file   Other Topics Concern    Not on file   Social History Narrative    Not on

## 2024-05-03 NOTE — CARE COORDINATION
Care Transitions Follow Up Call    Patient: Irwin Dillon  Patient : 1965   MRN: [id number]  Reason for Admission: stroke-like symptom   Discharge Date: 3/26/24 RARS: Readmission Risk Score: 7.9    Second and final attempt made to reach patient for transition call.  VM left stating purpose of call along with my contact information requesting a return call.    Follow Up  Future Appointments   Date Time Provider Department Center   2024 10:00 AM [facility]ULAR, VASCULAR LAB ROOM 2 [facility]   2024  1:20 PM Latasha Pritchett MD [facility] None        Care Transitions Subsequent and Final Call    Subsequent and Final Calls  Care Transitions Interventions  Other Interventions:               Alka Waggoner RN     
Normal for race

## 2024-05-07 ENCOUNTER — HOSPITAL ENCOUNTER (OUTPATIENT)
Dept: VASCULAR LAB | Age: 59
Discharge: HOME | End: 2024-05-09
Payer: MEDICARE

## 2024-05-07 DIAGNOSIS — M79.89 SWELLING OF LEFT LOWER EXTREMITY: ICD-10-CM

## 2024-05-07 PROCEDURE — 93971 EXTREMITY STUDY: CPT

## 2024-05-07 PROCEDURE — 93971 EXTREMITY STUDY: CPT | Performed by: SURGERY

## 2024-05-08 ENCOUNTER — TELEPHONE (OUTPATIENT)
Age: 59
End: 2024-05-08

## 2024-05-08 NOTE — TELEPHONE ENCOUNTER
Mary called today on behalf of the sister to follow up on the Leave of absences paperwork ..    If Dr Elena is unable to fill out please send to pt's  PCP  Dr Pritchett  Fax number [phone]    Please review.

## 2024-05-08 NOTE — TELEPHONE ENCOUNTER
Office received forms from [employer] today.  Astrid states she gave these forms to Dr. Elena at his appt on 4/29 but neither Akila or myself received a copy of them back so [employer] faxed them again today.

## 2024-05-09 DIAGNOSIS — G89.29 CHRONIC PAIN OF LEFT ANKLE: Primary | ICD-10-CM

## 2024-05-09 DIAGNOSIS — M25.572 CHRONIC PAIN OF LEFT ANKLE: Primary | ICD-10-CM

## 2024-05-10 ENCOUNTER — TELEPHONE (OUTPATIENT)
Dept: INTERNAL MEDICINE CLINIC | Age: 59
End: 2024-05-10

## 2024-05-10 DIAGNOSIS — M25.572 CHRONIC PAIN OF LEFT ANKLE: Primary | ICD-10-CM

## 2024-05-10 DIAGNOSIS — G89.29 CHRONIC PAIN OF LEFT ANKLE: Primary | ICD-10-CM

## 2024-05-10 NOTE — TELEPHONE ENCOUNTER
----- Message from Latasha Pritchett MD sent at 5/10/2024  8:31 AM EDT -----  Contact: [phone]  Juan  ----- Message -----  From: Charissa Carrizales  Sent: 5/9/2024   2:14 PM EDT  To: Latasha Pritchett MD    Will have to put a referral in, which doctor do you recommend?   ----- Message -----  From: Latasha Pritchett MD  Sent: 5/9/2024   1:17 PM EDT  To: Charissa [facility]  ----- Message -----  From: Niurka Gallo MA  Sent: 5/9/2024  11:21 AM EDT  To: Latasha Pritchett MD    Patients sister called and states that [facility] does not take the patients insurance and that they would need to see someone else. Who would you refer him to instead? Please advise.     [address] - P [phone] - F [phone]  [address] [place] [address]  Phone: [phone]  Fax: [phone]

## 2024-05-10 NOTE — TELEPHONE ENCOUNTER
Forms completed and faxed back to [employer].  Astrid rachel.  Documents scanned under media.

## 2024-05-10 NOTE — TELEPHONE ENCOUNTER
Per Dr. Ulices Dillon should be the one ordering the MRI that should be repeated in 3 months.  Then pt can f/u with us after that MRI is completed.  Spoke with Astrid today and informed her of this.  She said [facility] said they would call them when MRI is due (late-July).  I advised she call them in early July if they've not reached out to schedule another brain MRI by then.

## 2024-05-15 ENCOUNTER — TELEPHONE (OUTPATIENT)
Dept: INTERNAL MEDICINE CLINIC | Age: 59
End: 2024-05-15

## 2024-05-15 NOTE — TELEPHONE ENCOUNTER
----- Message from Jackie Her sent at 5/15/2024  3:15 PM EDT -----  Contact: Seth [phone]    ----- Message -----  From: Meagan Burrell MD  Sent: 5/15/2024   3:10 PM EDT  To: Jackie Her    ok  ----- Message -----  From: Niurka Gallo MA  Sent: 5/15/2024   2:24 PM EDT  To: MD Seth Thompson with [facility] called and wanted to inform us that he is D/C-ing the patient from in home PT and is requesting that orders for outpatient PT be placed.       [address] - P [phone] - F [phone]  [address] [place], [place] [address]  Phone: [phone]  Fax: [phone]

## 2024-05-16 ENCOUNTER — OFFICE VISIT (OUTPATIENT)
Dept: ORTHOPEDIC SURGERY | Age: 59
End: 2024-05-16
Payer: MEDICARE

## 2024-05-16 ENCOUNTER — TELEPHONE (OUTPATIENT)
Dept: INTERNAL MEDICINE CLINIC | Age: 59
End: 2024-05-16

## 2024-05-16 DIAGNOSIS — M25.472 ANKLE EFFUSION, LEFT: ICD-10-CM

## 2024-05-16 DIAGNOSIS — M79.89 SWELLING OF LEFT LOWER EXTREMITY: Primary | ICD-10-CM

## 2024-05-16 DIAGNOSIS — M79.672 PAIN IN LEFT FOOT: ICD-10-CM

## 2024-05-16 DIAGNOSIS — R52 PAIN: Primary | ICD-10-CM

## 2024-05-16 LAB
BASOPHILS # BLD: 0 K/UL (ref 0–0.2)
BASOPHILS NFR BLD: 0.5 %
CRP SERPL-MCNC: 3.2 MG/L (ref 0–5.1)
DEPRECATED RDW RBC AUTO: 13.1 % (ref 12.4–15.4)
EOSINOPHIL # BLD: 0.1 K/UL (ref 0–0.6)
EOSINOPHIL NFR BLD: 1.2 %
ERYTHROCYTE [SEDIMENTATION RATE] IN BLOOD BY WESTERGREN METHOD: 19 MM/HR (ref 0–20)
HCT VFR BLD AUTO: 40.9 % (ref 40.5–52.5)
HGB BLD-MCNC: 14.2 G/DL (ref 13.5–17.5)
LYMPHOCYTES # BLD: 1.8 K/UL (ref 1–5.1)
LYMPHOCYTES NFR BLD: 26.6 %
MCH RBC QN AUTO: 29 PG (ref 26–34)
MCHC RBC AUTO-ENTMCNC: 34.6 G/DL (ref 31–36)
MCV RBC AUTO: 83.7 FL (ref 80–100)
MONOCYTES # BLD: 0.5 K/UL (ref 0–1.3)
MONOCYTES NFR BLD: 7.4 %
NEUTROPHILS # BLD: 4.4 K/UL (ref 1.7–7.7)
NEUTROPHILS NFR BLD: 64.3 %
PLATELET # BLD AUTO: 264 K/UL (ref 135–450)
PMV BLD AUTO: 8.7 FL (ref 5–10.5)
RBC # BLD AUTO: 4.88 M/UL (ref 4.2–5.9)
WBC # BLD AUTO: 6.9 K/UL (ref 4–11)

## 2024-05-16 PROCEDURE — 99204 OFFICE O/P NEW MOD 45 MIN: CPT | Performed by: ORTHOPAEDIC SURGERY

## 2024-05-16 NOTE — TELEPHONE ENCOUNTER
----- Message from Jackie Her sent at 5/15/2024  3:15 PM EDT -----  Contact: Seth [phone]    ----- Message -----  From: Meagan Burrell MD  Sent: 5/15/2024   3:10 PM EDT  To: Jackie Her    ok  ----- Message -----  From: Niurka Gallo MA  Sent: 5/15/2024   2:24 PM EDT  To: MD Seth Thompson with [facility] called and wanted to inform us that he is D/C-ing the patient from in home PT and is requesting that orders for outpatient PT be placed.       [address] - P [phone] - F [phone]  [address] [place], [place] [id number]  Phone: [phone]  Fax: [phone]

## 2024-05-20 ENCOUNTER — TELEPHONE (OUTPATIENT)
Dept: ORTHOPEDIC SURGERY | Age: 59
End: 2024-05-20

## 2024-05-20 NOTE — TELEPHONE ENCOUNTER
General Question     Subject: BLOOD WORK  Patient and /or Facility Request: Astrid Sparrow   Contact Number: [phone]     SISTER CLLED TO [facility] ON LAB RESULTS    PLEASE CLL TO ADV

## 2024-05-28 ENCOUNTER — TELEPHONE (OUTPATIENT)
Dept: INTERNAL MEDICINE CLINIC | Age: 59
End: 2024-05-28

## 2024-05-28 DIAGNOSIS — R41.3 MEMORY LOSS: Primary | ICD-10-CM

## 2024-05-28 NOTE — TELEPHONE ENCOUNTER
----- Message from Latasha Pritchett MD sent at 5/28/2024 11:00 AM EDT -----  Contact: Loren [phone]  See neurology  ----- Message -----  From: Niurka Gallo MA  Sent: 5/21/2024   2:16 PM EDT  To: Latasha Pritchett MD    Patients sister called to inform you that the patient is refusing to do outpatient Physical Therapy due to the cost of the copay. His insurance has the copay at $80/ session and he can not afford that. Secondly, she states that they went and saw Dr Martinez. He did blood work and an xray. The blood showed nothing abnormal and no inflammation. The xray only showed some arthritis in the L ankle. She said the patient still is constantly complaining about his legs and feet swelling, he complains mostly due to the fact that he is having memory issues. He will ask a question and then 5 minutes later, ask the same question again. Sister, Astrid, is getting very frustrated and does not know where to go next about all this. Please advise.       [facility] [address] [place] [address] - [facility] - P [phone] - F [phone]  [address] [place] [address]  Phone: [phone]  Fax: [phone]

## 2024-05-29 NOTE — TELEPHONE ENCOUNTER
Patient sister advised patient is already seeing Dr. Elena. She was advised to contact Dr. Martinez office to see if there was another place for physical therapy that was affordable. She was also informed she could call the insurance and see if there was a specific place in the network.

## 2024-06-05 ENCOUNTER — TELEPHONE (OUTPATIENT)
Age: 59
End: 2024-06-05

## 2024-06-05 NOTE — TELEPHONE ENCOUNTER
Ref red from Dr. Pritchett, for memory loss. Pt is established with this office.    Please advise on scheduling    LMCB for sister to cb for scheduling.     Niurka Alford MA RW    5/10/24 12:30 PM  Note      Per Dr. Ulices Dillon should be the one ordering the MRI that should be repeated in 3 months.  Then pt can f/u with us after that MRI is completed.  Spoke with Astrid today and informed her of this.  She said [facility] said they would call them when MRI is due (late-July).  I advised she call them in early July if they've not reached out to schedule another brain MRI by then.

## 2024-06-05 NOTE — TELEPHONE ENCOUNTER
We have not been seeing patient for memory loss so it may not be a bad idea to bring him in for appt to see Dr. Elena (next available for est pt - can ask Flor where to sched if not sure), even though previous plan was for him to f/u with Dr. Elena after he repeats MRI for [facility] in late July.

## 2024-06-13 ENCOUNTER — OFFICE VISIT (OUTPATIENT)
Dept: INTERNAL MEDICINE CLINIC | Age: 59
End: 2024-06-13

## 2024-06-13 VITALS
HEART RATE: 76 BPM | BODY MASS INDEX: 33.21 KG/M2 | DIASTOLIC BLOOD PRESSURE: 88 MMHG | WEIGHT: 232 LBS | HEIGHT: 70 IN | SYSTOLIC BLOOD PRESSURE: 122 MMHG

## 2024-06-13 DIAGNOSIS — I10 ESSENTIAL HYPERTENSION, BENIGN: Primary | ICD-10-CM

## 2024-06-13 DIAGNOSIS — E78.5 HYPERLIPIDEMIA, UNSPECIFIED HYPERLIPIDEMIA TYPE: ICD-10-CM

## 2024-06-13 PROCEDURE — 3074F SYST BP LT 130 MM HG: CPT | Performed by: INTERNAL MEDICINE

## 2024-06-13 PROCEDURE — 3079F DIAST BP 80-89 MM HG: CPT | Performed by: INTERNAL MEDICINE

## 2024-06-13 PROCEDURE — 99213 OFFICE O/P EST LOW 20 MIN: CPT | Performed by: INTERNAL MEDICINE

## 2024-06-13 ASSESSMENT — ENCOUNTER SYMPTOMS
VOMITING: 0
BLOOD IN STOOL: 0
WHEEZING: 0
CHEST TIGHTNESS: 0
NAUSEA: 0
COUGH: 0
ABDOMINAL PAIN: 0
SHORTNESS OF BREATH: 0
DIARRHEA: 0

## 2024-06-13 NOTE — PROGRESS NOTES
Irwin Dillon (:  1965) is a 58 y.o. male,Established patient, here for evaluation of the following chief complaint(s):  Follow-up      Assessment & Plan         Diagnosis Orders   1. Essential hypertension, benign        2. Hyperlipidemia, unspecified hyperlipidemia type            HTN  Blood pressure is good   Continue  lisinopril  20 mg  daily  Advised weight loss    HLD  Continue lipitor.         Subjective   HPI  Is here for follow up of HTN.  Patient's BP is controlled on current medications.  He is doing well.  He has HLD- on statin    Has a possible Glioma in left inferior tempora lgyrus.  [facility].  Needs follow up MRI    Has seen neurology for abnormal MRI brain-on ASA,statin    Review of Systems   Constitutional: Negative.  Negative for fatigue and fever.   Respiratory:  Negative for cough, chest tightness, shortness of breath and wheezing.    Cardiovascular:  Negative for chest pain and palpitations.   Gastrointestinal:  Negative for abdominal pain, blood in stool, diarrhea, nausea and vomiting.   Genitourinary:  Negative for hematuria.          Objective   Physical Exam  Constitutional:       Appearance: He is well-developed.   HENT:      Head: Normocephalic and atraumatic.   Eyes:      Pupils: Pupils are equal, round, and reactive to light.   Neck:      Thyroid: No thyromegaly.   Cardiovascular:      Rate and Rhythm: Normal rate and regular rhythm.      Heart sounds: Normal heart sounds. No murmur heard.     No friction rub. No gallop.      Comments: No carotid bruit  Pulmonary:      Effort: Pulmonary effort is normal. No respiratory distress.      Breath sounds: Normal breath sounds. No wheezing or rales.   Chest:      Chest wall: No tenderness.   Abdominal:      General: Bowel sounds are normal. There is no distension.      Palpations: Abdomen is soft. There is no mass.      Tenderness: There is no abdominal tenderness. There is no guarding or rebound.

## 2024-07-16 RX ORDER — LISINOPRIL 20 MG/1
20 TABLET ORAL DAILY
Qty: 90 TABLET | Refills: 0 | OUTPATIENT
Start: 2024-07-16

## 2024-07-22 ENCOUNTER — HOSPITAL ENCOUNTER (OUTPATIENT)
Age: 59
Discharge: HOME | End: 2024-07-22
Attending: NEUROLOGICAL SURGERY
Payer: MEDICARE

## 2024-07-22 ENCOUNTER — HOSPITAL ENCOUNTER (OUTPATIENT)
Dept: MRI IMAGING | Age: 59
Discharge: HOME | End: 2024-07-22
Attending: NEUROLOGICAL SURGERY
Payer: MEDICARE

## 2024-07-22 DIAGNOSIS — D48.9 NEOPLASM OF UNCERTAIN BEHAVIOR: ICD-10-CM

## 2024-07-22 LAB
PERFORMED ON: NORMAL
POC CREATININE: 1.1 MG/DL (ref 0.9–1.3)
POC SAMPLE TYPE: NORMAL

## 2024-07-22 PROCEDURE — 6360000004 HC RX CONTRAST MEDICATION: Performed by: NEUROLOGICAL SURGERY

## 2024-07-22 PROCEDURE — 82565 ASSAY OF CREATININE: CPT

## 2024-07-22 PROCEDURE — 70553 MRI BRAIN STEM W/O & W/DYE: CPT

## 2024-07-22 PROCEDURE — A9579 GAD-BASE MR CONTRAST NOS,1ML: HCPCS | Performed by: NEUROLOGICAL SURGERY

## 2024-07-22 RX ADMIN — GADOTERIDOL 20 ML: 279.3 INJECTION, SOLUTION INTRAVENOUS at 15:35

## 2024-08-05 ENCOUNTER — TELEPHONE (OUTPATIENT)
Dept: INTERNAL MEDICINE CLINIC | Age: 59
End: 2024-08-05

## 2024-08-05 RX ORDER — ATORVASTATIN CALCIUM 40 MG/1
40 TABLET, FILM COATED ORAL NIGHTLY
Qty: 90 TABLET | Refills: 1 | Status: SHIPPED | OUTPATIENT
Start: 2024-08-05

## 2024-08-05 NOTE — TELEPHONE ENCOUNTER
----- Message from Niurka Gallo MA sent at 8/5/2024  1:05 PM EDT -----  Contact: [phone] (patients video  phone)  Wendy called and states that he is in need of a refill of the below medication.     atorvastatin (LIPITOR) 40 MG tablet    [facility] in [facility]

## 2024-09-13 ENCOUNTER — TELEPHONE (OUTPATIENT)
Dept: INTERNAL MEDICINE CLINIC | Age: 59
End: 2024-09-13

## 2024-09-13 RX ORDER — LISINOPRIL 20 MG/1
20 TABLET ORAL DAILY
Qty: 90 TABLET | Refills: 0 | Status: SHIPPED | OUTPATIENT
Start: 2024-09-13 | End: 2024-12-12

## 2024-09-13 RX ORDER — LISINOPRIL 20 MG/1
20 TABLET ORAL DAILY
Qty: 90 TABLET | Refills: 0 | OUTPATIENT
Start: 2024-09-13

## 2024-11-01 ENCOUNTER — TELEPHONE (OUTPATIENT)
Dept: INTERNAL MEDICINE CLINIC | Age: 59
End: 2024-11-01

## 2024-11-01 RX ORDER — ATORVASTATIN CALCIUM 40 MG/1
40 TABLET, FILM COATED ORAL NIGHTLY
Qty: 90 TABLET | Refills: 0 | Status: SHIPPED | OUTPATIENT
Start: 2024-11-01

## 2024-11-01 RX ORDER — LISINOPRIL 20 MG/1
20 TABLET ORAL DAILY
Qty: 90 TABLET | Refills: 0 | Status: SHIPPED | OUTPATIENT
Start: 2024-11-01 | End: 2025-01-30

## 2024-11-01 NOTE — TELEPHONE ENCOUNTER
----- Message from Bri ZUNIGA sent at 11/1/2024  1:02 PM EDT -----  Contact: CINTHYA [phone]  Patient is requesting a refill on the below medications. Please advise     atorvastatin (LIPITOR) 40 MG tablet     lisinopril (PRINIVIL;ZESTRIL) 20 MG tablet     [facility] [address] [place] [address] - P [phone] - F [phone]  [address] [place], [place] [id number]  Phone: [phone]  Fax: [phone]

## 2024-12-11 ENCOUNTER — TELEPHONE (OUTPATIENT)
Dept: INTERNAL MEDICINE CLINIC | Age: 59
End: 2024-12-11

## 2024-12-11 DIAGNOSIS — Z12.11 COLON CANCER SCREENING: Primary | ICD-10-CM

## 2024-12-11 RX ORDER — LISINOPRIL 20 MG/1
20 TABLET ORAL DAILY
Qty: 90 TABLET | Refills: 0 | Status: SHIPPED | OUTPATIENT
Start: 2024-12-11 | End: 2024-12-11 | Stop reason: SDUPTHER

## 2024-12-11 RX ORDER — LISINOPRIL 20 MG/1
20 TABLET ORAL DAILY
Qty: 90 TABLET | Refills: 0 | Status: SHIPPED | OUTPATIENT
Start: 2024-12-11 | End: 2025-03-11

## 2024-12-11 NOTE — TELEPHONE ENCOUNTER
----- Message from Dr. Latasha Pritchett MD sent at 12/11/2024 12:20 PM EST -----  Contact: [phone]  Once a year only   If due, send him  ----- Message -----  From: Charissa Carrizales  Sent: 12/10/2024   2:49 PM EST  To: Latasha Pritchett MD    Ok to send Fit test?  ----- Message -----  From: Bri Mathias  Sent: 12/10/2024   2:45 PM EST  To: Charissa Carrizales    Patient states he is to be doing FIT Test every six months, but for the past year he has not received any of the at home kits and was wondering why. Patient would like to request a FIT Test be sent to the below address. Please advise     [facility] [address]

## 2025-01-07 ENCOUNTER — TELEPHONE (OUTPATIENT)
Dept: INTERNAL MEDICINE CLINIC | Age: 60
End: 2025-01-07

## 2025-01-07 RX ORDER — ATORVASTATIN CALCIUM 40 MG/1
40 TABLET, FILM COATED ORAL NIGHTLY
Qty: 90 TABLET | Refills: 0 | Status: SHIPPED | OUTPATIENT
Start: 2025-01-07

## 2025-01-07 NOTE — TELEPHONE ENCOUNTER
----- Message from Bri ZUNIGA sent at 1/7/2025 11:54 AM EST -----  Contact: CINTHYA [phone]  Patient is requesting the below medication be transferred to the [facility] pharmacy. Please advise     atorvastatin (LIPITOR) 40 MG tablet     [facility] - [place], [place] - [address] [facility] - P [phone] - F [phone] [address] [place] [address]  Phone: [phone]  Fax: [phone]

## 2025-01-13 ENCOUNTER — TELEPHONE (OUTPATIENT)
Dept: INTERNAL MEDICINE CLINIC | Age: 60
End: 2025-01-13

## 2025-01-13 NOTE — TELEPHONE ENCOUNTER
----- Message from Dr. Latasha Pritchett MD sent at 1/13/2025  2:16 PM EST -----  Contact: [phone]  Yes  No  Daily   Sure  ----- Message -----  From: Bri Mathias  Sent: 1/13/2025   9:40 AM EST  To: Latasha Pritchett MD    Patient asking if he is safe to be drinking Tumeric Tea/Tumeric Powder with his current medication, pt also curious if this will aid in weight loss. Pt asking if approved to drink, can he drink it daily or weekly? Pt also asked if this powder can be mixed with fruit juice. Please advise     lisinopril (PRINIVIL;ZESTRIL) 20 MG tablet     atorvastatin (LIPITOR) 40 MG tablet    [facility] [address] [place] [address] [address] [facility] - P [phone] - F [phone]  [address] [place] [address]  Phone: [phone]  Fax: [phone]

## 2025-01-24 ENCOUNTER — TELEPHONE (OUTPATIENT)
Dept: INTERNAL MEDICINE CLINIC | Age: 60
End: 2025-01-24

## 2025-01-24 ENCOUNTER — LAB (OUTPATIENT)
Dept: INTERNAL MEDICINE CLINIC | Age: 60
End: 2025-01-24

## 2025-01-24 DIAGNOSIS — R19.5 POSITIVE FIT (FECAL IMMUNOCHEMICAL TEST): Primary | ICD-10-CM

## 2025-01-24 DIAGNOSIS — Z12.11 COLON CANCER SCREENING: ICD-10-CM

## 2025-01-24 DIAGNOSIS — Z12.11 COLON CANCER SCREENING: Primary | ICD-10-CM

## 2025-01-24 LAB
CONTROL: NORMAL
FECAL BLOOD IMMUNOCHEMICAL TEST: NORMAL

## 2025-01-24 PROCEDURE — 82274 ASSAY TEST FOR BLOOD FECAL: CPT | Performed by: INTERNAL MEDICINE

## 2025-01-24 NOTE — TELEPHONE ENCOUNTER
----- Message from Dr. Latasha Pritchett MD sent at 1/24/2025  1:56 PM EST -----  Needs colonoscopy

## 2025-03-17 ENCOUNTER — TELEPHONE (OUTPATIENT)
Dept: INTERNAL MEDICINE CLINIC | Age: 60
End: 2025-03-17

## 2025-03-17 RX ORDER — LISINOPRIL 20 MG/1
20 TABLET ORAL DAILY
Qty: 90 TABLET | Refills: 0 | Status: SHIPPED | OUTPATIENT
Start: 2025-03-17 | End: 2025-06-15

## 2025-03-17 NOTE — TELEPHONE ENCOUNTER
----- Message from Sherrie DELACRUZ sent at 3/17/2025  2:41 PM EDT -----  Contact: Patient [phone]  Patient requesting lisinopril (PRINIVIL;ZESTRIL) 20 MG tablet        [facility] [address] [place] [address] - P [phone] - F [phone]

## 2025-03-31 ENCOUNTER — OFFICE VISIT (OUTPATIENT)
Dept: INTERNAL MEDICINE CLINIC | Age: 60
End: 2025-03-31

## 2025-03-31 VITALS
HEART RATE: 76 BPM | DIASTOLIC BLOOD PRESSURE: 88 MMHG | WEIGHT: 233 LBS | SYSTOLIC BLOOD PRESSURE: 138 MMHG | HEIGHT: 70 IN | BODY MASS INDEX: 33.36 KG/M2

## 2025-03-31 DIAGNOSIS — Z00.00 INITIAL MEDICARE ANNUAL WELLNESS VISIT: ICD-10-CM

## 2025-03-31 DIAGNOSIS — Z00.00 ROUTINE GENERAL MEDICAL EXAMINATION AT A HEALTH CARE FACILITY: ICD-10-CM

## 2025-03-31 DIAGNOSIS — E78.5 HYPERLIPIDEMIA, UNSPECIFIED HYPERLIPIDEMIA TYPE: ICD-10-CM

## 2025-03-31 DIAGNOSIS — I10 ESSENTIAL HYPERTENSION, BENIGN: ICD-10-CM

## 2025-03-31 DIAGNOSIS — Z00.00 INITIAL MEDICARE ANNUAL WELLNESS VISIT: Primary | ICD-10-CM

## 2025-03-31 DIAGNOSIS — H91.90 HARD OF HEARING: ICD-10-CM

## 2025-03-31 PROBLEM — C71.9 GLIOMA (HCC): Status: ACTIVE | Noted: 2025-03-31

## 2025-03-31 PROCEDURE — G0438 PPPS, INITIAL VISIT: HCPCS | Performed by: INTERNAL MEDICINE

## 2025-03-31 PROCEDURE — 3079F DIAST BP 80-89 MM HG: CPT | Performed by: INTERNAL MEDICINE

## 2025-03-31 PROCEDURE — 3075F SYST BP GE 130 - 139MM HG: CPT | Performed by: INTERNAL MEDICINE

## 2025-03-31 ASSESSMENT — PATIENT HEALTH QUESTIONNAIRE - PHQ9
2. FEELING DOWN, DEPRESSED OR HOPELESS: SEVERAL DAYS
SUM OF ALL RESPONSES TO PHQ QUESTIONS 1-9: 1

## 2025-03-31 NOTE — PROGRESS NOTES
Medicare Annual Wellness Visit    Irwin Dillon is here for Medicare AWV    Assessment & Plan   Initial Medicare annual wellness visit  -     PSA, Prostatic Specific Antigen; Future  Routine general medical examination at a health care facility  Essential hypertension, benign  Hard of hearing  Hyperlipidemia, unspecified hyperlipidemia type  -     Lipid Panel; Future  -     Hepatic Function Panel; Future       AWV    HTN  Stable.  Continue  lisinopril  20 mg  daily  Advised weight loss     HLD  Continue lipitor.    PFO  Takes baby ASA daily      Subjective   The following acute and/or chronic problems were also addressed today:   Diagnosis Orders   1. Routine general medical examination at a health care facility        2. Essential hypertension, benign        3. Hard of hearing            Patient's complete Health Risk Assessment and screening values have been reviewed and are found in Flowsheets. The following problems were reviewed today and where indicated follow up appointments were made and/or referrals ordered.    Positive Risk Factor Screenings with Interventions:                Abnormal BMI (obese):  Body mass index is 33.43 kg/m². (!) Abnormal    Interventions:  Cut down on carbs     Dentist Screen:  Have you seen the dentist within the past year?: (!) No    Intervention:  Advised to schedule with their dentist                    Objective   Vitals:    03/31/25 1459   BP: 138/88   Pulse: 76   Weight: 105.7 kg (233 lb)   Height: 1.778 m (5' 10\")      Body mass index is 33.43 kg/m².        General Appearance: alert and oriented to person, place and time, well developed and well- nourished, in no acute distress  Skin: warm and dry, no rash or erythema  Head: normocephalic and atraumatic  Eyes: pupils equal, round, and reactive to light, extraocular eye movements intact, conjunctivae normal  Neck: supple and non-tender without mass, no thyromegaly or thyroid nodules, no cervical

## 2025-04-01 ENCOUNTER — RESULTS FOLLOW-UP (OUTPATIENT)
Dept: INTERNAL MEDICINE CLINIC | Age: 60
End: 2025-04-01

## 2025-04-01 DIAGNOSIS — R79.89 ELEVATED LFTS: Primary | ICD-10-CM

## 2025-04-01 LAB
ALBUMIN SERPL-MCNC: 4.7 G/DL (ref 3.4–5)
ALP SERPL-CCNC: 83 U/L (ref 40–129)
ALT SERPL-CCNC: 42 U/L (ref 10–40)
AST SERPL-CCNC: 27 U/L (ref 15–37)
BILIRUB DIRECT SERPL-MCNC: 0.4 MG/DL (ref 0–0.3)
BILIRUB INDIRECT SERPL-MCNC: 0.5 MG/DL (ref 0–1)
BILIRUB SERPL-MCNC: 0.9 MG/DL (ref 0–1)
CHOLEST SERPL-MCNC: 96 MG/DL (ref 0–199)
HDLC SERPL-MCNC: 42 MG/DL (ref 40–60)
LDLC SERPL CALC-MCNC: 38 MG/DL
PROT SERPL-MCNC: 7.7 G/DL (ref 6.4–8.2)
PSA SERPL DL<=0.01 NG/ML-MCNC: 0.93 NG/ML (ref 0–4)
TRIGL SERPL-MCNC: 82 MG/DL (ref 0–150)
VLDLC SERPL CALC-MCNC: 16 MG/DL

## 2025-04-04 ENCOUNTER — HOSPITAL ENCOUNTER (OUTPATIENT)
Dept: ULTRASOUND IMAGING | Age: 60
Discharge: HOME | End: 2025-04-04
Attending: INTERNAL MEDICINE
Payer: MEDICARE

## 2025-04-04 DIAGNOSIS — R79.89 ELEVATED LFTS: ICD-10-CM

## 2025-04-04 PROCEDURE — 76705 ECHO EXAM OF ABDOMEN: CPT

## 2025-04-08 ENCOUNTER — RESULTS FOLLOW-UP (OUTPATIENT)
Dept: INTERNAL MEDICINE CLINIC | Age: 60
End: 2025-04-08

## 2025-04-14 ENCOUNTER — TELEPHONE (OUTPATIENT)
Dept: INTERNAL MEDICINE CLINIC | Age: 60
End: 2025-04-14

## 2025-04-14 NOTE — TELEPHONE ENCOUNTER
----- Message from Dr. Latasha Pritchett MD sent at 4/14/2025 12:51 PM EDT -----  Contact: [phone]  There is not such tea  ----- Message -----  From: Tiffany Carter  Sent: 4/14/2025  11:02 AM EDT  To: Latasha Pritchett MD    Pt asking if there is a tea that is good for him to drink for his liver health? Please advise

## 2025-04-30 PROBLEM — Z00.00 ROUTINE GENERAL MEDICAL EXAMINATION AT A HEALTH CARE FACILITY: Status: RESOLVED | Noted: 2025-03-31 | Resolved: 2025-04-30

## 2025-05-08 ENCOUNTER — TELEPHONE (OUTPATIENT)
Dept: PHARMACY | Facility: CLINIC | Age: 60
End: 2025-05-08

## 2025-05-08 RX ORDER — ATORVASTATIN CALCIUM 40 MG/1
40 TABLET, FILM COATED ORAL NIGHTLY
Qty: 90 TABLET | Refills: 1 | Status: CANCELLED | OUTPATIENT
Start: 2025-05-08

## 2025-05-08 NOTE — TELEPHONE ENCOUNTER
Irwin Sullivan Dr. is requesting refills for Atorvastatin 40mg.    Order(s) pending for patient's preferred pharmacy, if appropriate.      Requested Prescriptions     Pending Prescriptions Disp Refills    atorvastatin (LIPITOR) 40 MG tablet 90 tablet 1     Sig: Take 1 tablet by mouth nightly         PCP: Latasha Pritchett MD  Last appt: 3/31/2025  Next appt: No future appointments. (Last visit noted: Return in about 6 months (around 9/30/2025).       Best Regards,    Syeda Gonzales, PharmD., [facility]  Population Health Pharmacist  [facility]  Department, toll free: [phone], option 1

## 2025-05-08 NOTE — TELEPHONE ENCOUNTER
[facility] CLINICAL PHARMACY: ADHERENCE REVIEW  Identified care gap per [employer]    Patient also appears to be prescribed: ACE/ARB        ASSESSMENT    STATIN ADHERENCE   Insurance Records claims through  4/22/25 (Prior Year PDC = N/A;  YTD PDC = 85.71%; Potential Fail Date: 6/18/2025):     Atorvastatin 40mg last filled on 1/8/2025 for 90 day supply. Next refill due: 4/8/2025 (technically was not due until 4/30/2025 based on fill dates from 11/1/24)  Fill History: 3/21/24 (30ds), 11/1/24 (90ds); 1/8/25 (90ds) - per Med Rec Dispense Report and payer data  Prescribed sig:  Take 1 (ONE) (WHOLE) tablet by mouth  once nightly  Last ordered on 1/7/2025 #90, 0RF    Rx #[id number]        ACE/ARB ADHERENCE (not targeted med; active in EMR)  Lisinopril 20mg last filled on 3/17/2025 for 90 day supply. Anticipated next refill due: 6/17/2025  Fill History: 2/6/24, 4/29/24 (90ds each); 1/7/25 (60ds), 3/17/25 (90ds) - per Med Rec Dispense Report and payer data  Prescribed sig:  Take 1 (ONE) (WHOLE) tablet by mouth ONCE daily   Last ordered on 3/17/2025 #90, 0RF        PLAN  The following are interventions that have been identified:   Per insurer report, LIS-0 - co-pays are based on tiers and patient is subject to coverage gap.  Patient OVERDUE refilling Atorvastatin and active on home medication list.   Patient NEEDS REFILLS for Atorvastatin       Spoke to sister, Astrid, per POA.  Patient is still taking Atorvastatin daily and reports currently still has supply at home, but will need a refill.  Current Rx at home does not have refills available.      Last Visit: 3/31/2025 (PCP)   Next Visit: No future appointments. (Last visit noted: Return in about 6 months (around 9/30/2025).      Syeda Gonzales, PharmD., [facility]  Population Health Pharmacist  [facility] Clinical [facility]  Department, toll free: [phone], option 1     =======================================================  For

## 2025-05-13 RX ORDER — ATORVASTATIN CALCIUM 40 MG/1
40 TABLET, FILM COATED ORAL NIGHTLY
Qty: 90 TABLET | Refills: 0 | Status: SHIPPED | OUTPATIENT
Start: 2025-05-13

## 2025-05-14 NOTE — TELEPHONE ENCOUNTER
Atorvastatin refill sent 5/13/2025.    Syeda Gonzales, PharmD., [facility]  Population Health Pharmacist  [facility] Clinical [facility]  Department, toll free: [phone], option 1

## 2025-08-11 RX ORDER — LISINOPRIL 20 MG/1
20 TABLET ORAL DAILY
Qty: 90 TABLET | Refills: 0 | Status: SHIPPED | OUTPATIENT
Start: 2025-08-11